# Patient Record
Sex: FEMALE | Race: ASIAN | NOT HISPANIC OR LATINO | Employment: UNEMPLOYED | URBAN - METROPOLITAN AREA
[De-identification: names, ages, dates, MRNs, and addresses within clinical notes are randomized per-mention and may not be internally consistent; named-entity substitution may affect disease eponyms.]

---
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
## 2019-05-17 ENCOUNTER — OFFICE VISIT (OUTPATIENT)
Dept: FAMILY MEDICINE | Facility: CLINIC | Age: 19
End: 2019-05-17
Payer: COMMERCIAL

## 2019-05-17 VITALS
HEIGHT: 60 IN | OXYGEN SATURATION: 99 % | DIASTOLIC BLOOD PRESSURE: 73 MMHG | TEMPERATURE: 99.1 F | WEIGHT: 110 LBS | SYSTOLIC BLOOD PRESSURE: 108 MMHG | HEART RATE: 67 BPM | RESPIRATION RATE: 12 BRPM | BODY MASS INDEX: 21.6 KG/M2

## 2019-05-17 DIAGNOSIS — R09.82 POSTNASAL DRIP: Primary | ICD-10-CM

## 2019-05-17 RX ORDER — CETIRIZINE HYDROCHLORIDE 5 MG/1
5 TABLET ORAL DAILY
Qty: 30 TABLET | Refills: 1 | Status: SHIPPED | OUTPATIENT
Start: 2019-05-17 | End: 2019-12-04

## 2019-05-17 RX ORDER — FLUTICASONE PROPIONATE 50 MCG
1 SPRAY, SUSPENSION (ML) NASAL DAILY
Qty: 16 G | Refills: 11 | Status: SHIPPED | OUTPATIENT
Start: 2019-05-17 | End: 2019-12-04

## 2019-05-17 ASSESSMENT — MIFFLIN-ST. JEOR: SCORE: 1199.43

## 2019-05-17 NOTE — NURSING NOTE
Due to patient being non-English speaking/uses sign language, an  was used for this visit. Only for face-to-face interpretation by an external agency, date and length of interpretation can be found on the scanned worksheet.     name: Jose Covington  Agency: [employer]  Language: Carole   Telephone number: [phone]  Type of interpretation: Face-to-face, spoken

## 2019-05-17 NOTE — PATIENT INSTRUCTIONS
1. Postnasal drip    - cetirizine (ZYRTEC) 5 MG tablet; Take 1 tablet (5 mg) by mouth daily  Dispense: 30 tablet; Refill: 1  - fluticasone (FLONASE) 50 MCG/ACT nasal spray; Spray 1 spray into both nostrils daily  Dispense: 16 g; Refill: 11  Back in 1 month if not better    Dr. Nargis Lentz

## 2019-05-17 NOTE — PROGRESS NOTES
SUBJECTIVE     Chief Complaint   Patient presents with     Cough     Ongoing dry and mucus cough     Nasal Congestion       Subjective: Lucia Covington is a 18 year old female with no significant past medical history here for cough.    Patient has had cough for the past month and 2 weeks of nasal congestion.  No fevers, otherwise feels well.  Eating okay, no nausea or vomiting.  Has had a history of trauma to her nose when she was younger.  She is unsure if she has any history of seasonal allergies.  She has not tried any medications for this.  She is unclear of any triggers making her symptoms worse.    ROS:    General: No fevers or weight loss  Skin: No new rashes or lesions.  CV: No chest pain or palpitations.  Resp: No shortness of breath or cough  GI: No nausea, vomiting, diarrhea, or constipation.    PMH/PSH, FamHx, Meds, Allergies reviewed and updated as needed.    Social History     Socioeconomic History     Marital status: Single     Spouse name: None     Number of children: None     Years of education: None     Highest education level: None   Occupational History     None   Social Needs     Financial resource strain: None     Food insecurity:     Worry: None     Inability: None     Transportation needs:     Medical: None     Non-medical: None   Tobacco Use     Smoking status: Never Smoker     Smokeless tobacco: Never Used     Tobacco comment: No Exposure    Substance and Sexual Activity     Alcohol use: No     Drug use: No     Sexual activity: Never   Lifestyle     Physical activity:     Days per week: None     Minutes per session: None     Stress: None   Relationships     Social connections:     Talks on phone: None     Gets together: None     Attends Hinduism service: None     Active member of club or organization: None     Attends meetings of clubs or organizations: None     Relationship status: None     Intimate partner violence:     Fear of current or ex partner: None     Emotionally abused: None  "    Physically abused: None     Forced sexual activity: None   Other Topics Concern     None   Social History Narrative     None           OBJECTIVE     /73 (BP Location: Left arm, Patient Position: Sitting, Cuff Size: Adult Regular)   Pulse 67   Temp 99.1  F (37.3  C) (Oral)   Resp 12   Ht 1.53 m (5' 0.25\")   Wt 49.9 kg (110 lb)   SpO2 99%   BMI 21.30 kg/m    Body mass index is 21.3 kg/m .    Physical exam:  Gen: No acute distress  HEENT: external auditory canals normal. TMs normal. No conjunctival injection. +clear rhinorrhea. Nasal septum deviated to patient's L with boggy bluish middle turbinates bilaterally. No pharyngeal erythema, +mucoid drainage of oropharynx  Neck: No cervical lymphadenopathy  CV: Regular rate and rhythm, no murmurs/rubs/gallops  Resp: Clear to auscultation bilaterally, no crackles or wheezes  ABD: soft, nontender, no masses, no rebound.  Psych: Mood and affect appropriate, mentation appears normal.    No results found for this or any previous visit (from the past 24 hour(s)).      ASSESSMENT AND PLAN     Lucia Covington is a 18 year old female here for cough    1. Postnasal drip  Normal vitals. Symptoms and exam suggest allergic rhinitis. Will try antihistamine and nasal corticosteroid, back if no relief.   - cetirizine (ZYRTEC) 5 MG tablet; Take 1 tablet (5 mg) by mouth daily  Dispense: 30 tablet; Refill: 1  - fluticasone (FLONASE) 50 MCG/ACT nasal spray; Spray 1 spray into both nostrils daily  Dispense: 16 g; Refill: 11      Nargis Lentz MD, PGY-3  I precepted today with Miles Richey MD.    "

## 2019-05-17 NOTE — PROGRESS NOTES
Preceptor Attestation:   Patient seen, evaluated and discussed with the resident. I have verified the content of the note, which accurately reflects my assessment of the patient and the plan of care.   Supervising Physician:  Miles Richey MD

## 2019-10-18 ENCOUNTER — OFFICE VISIT (OUTPATIENT)
Dept: FAMILY MEDICINE | Facility: CLINIC | Age: 19
End: 2019-10-18
Payer: COMMERCIAL

## 2019-10-18 VITALS
RESPIRATION RATE: 18 BRPM | TEMPERATURE: 97.9 F | DIASTOLIC BLOOD PRESSURE: 67 MMHG | SYSTOLIC BLOOD PRESSURE: 101 MMHG | HEIGHT: 60 IN | HEART RATE: 73 BPM | WEIGHT: 112.6 LBS | BODY MASS INDEX: 22.1 KG/M2 | OXYGEN SATURATION: 97 %

## 2019-10-18 DIAGNOSIS — Z00.00 ROUTINE GENERAL MEDICAL EXAMINATION AT A HEALTH CARE FACILITY: ICD-10-CM

## 2019-10-18 DIAGNOSIS — Z11.4 SCREENING FOR HIV (HUMAN IMMUNODEFICIENCY VIRUS): ICD-10-CM

## 2019-10-18 DIAGNOSIS — Z00.129 ENCOUNTER FOR ROUTINE CHILD HEALTH EXAMINATION WITHOUT ABNORMAL FINDINGS: Primary | ICD-10-CM

## 2019-10-18 DIAGNOSIS — Z23 NEED FOR PROPHYLACTIC VACCINATION AND INOCULATION AGAINST INFLUENZA: ICD-10-CM

## 2019-10-18 LAB — HIV 1+2 AB+HIV1 P24 AG SERPL QL IA: NEGATIVE

## 2019-10-18 ASSESSMENT — MIFFLIN-ST. JEOR: SCORE: 1209.12

## 2019-10-18 NOTE — PATIENT INSTRUCTIONS
-Stop by the lab for blood draw before leaving  -Come back in 1 year for a physical with pap smear    Preventive Health Recommendations  Female Ages 18 to 20     Yearly exam:     See your health care provider every year in order to  o Review health changes.   o Discuss preventive care.    o Review your medicines if your doctor has prescribed any.      You should be tested each year for STDs (sexually transmitted diseases).       After age 20, talk to your provider about how often you should have cholesterol testing.      If you are at risk for diabetes, you should have a diabetes test (fasting glucose).     Shots:     Get a flu shot each year.     Get a tetanus shot every 10 years.     Consider getting the shot (vaccine) that prevents cervical cancer (Gardasil).    Nutrition:     Eat at least 5 servings of fruits and vegetables each day.    Eat whole-grain bread, whole-wheat pasta and brown rice instead of white grains and rice.    Get adequate Calcium and Vitamin D.     Lifestyle    Exercise at least 150 minutes a week each week (30 minutes a day, 5 days a week). This will help you control your weight and prevent disease.    No smoking.     Wear sunscreen to prevent skin cancer.    See your dentist every six months for an exam and cleaning.

## 2019-10-18 NOTE — PROGRESS NOTES
Preceptor Attestation:   Patient seen, evaluated and discussed with the resident. I have verified the content of the note, which accurately reflects my assessment of the patient and the plan of care.   Supervising Physician:  Charli Hou MD

## 2019-10-18 NOTE — NURSING NOTE
Injectable influenza vaccine documentation    1. Has the patient received the information for the influenza vaccine? YES    2. Does the patient have a severe allergy to eggs (Patients with a severe egg allergy should be assessed by a medical provider, RN, or clinical pharmacist. If they receive the influenza vaccine, please have them observed for 15 minutes.)? No    3. Has the patient had an allergic reaction to previous influenza vaccines? No    4. Has the patient had any severe allergic reactions to past influenza vaccines ? No       5. Does patient have a history of Guillain-Brooks syndrome? No      Based on responses above, I administered the influenza vaccine.  Lizz Walters CMA    Due to patient being non-English speaking/uses sign language, an  was used for this visit. Only for face-to-face interpretation by an external agency, date and length of interpretation can be found on the scanned worksheet.     name: Jose Covington  Agency: [employer]  Language: Carole   Telephone number: [phone]  Type of interpretation: Face-to-face, spoken

## 2019-10-18 NOTE — PROGRESS NOTES
Female Physical Note    Concerns today: No special concerns today.    A Unbounce  was used for  this visit.     ROS:  CONSTITUTIONAL: no fatigue, no unexpected change in weight  SKIN: no worrisome rashes, no worrisome moles, no worrisome lesions  EYES: no acute vision problems or changes  ENT: no ear problems, no mouth problems, no throat problems  RESP: no significant cough, no shortness of breath  CV: no chest pain, no palpitations, no new or worsening peripheral edema  GI: no nausea, no vomiting, no constipation, no diarrhea    Sexually Active: No  Sexual concerns: No   Contraception: Not needed   P:0  Menarche: Irregular Patient's last menstrual period was 2019 (approximate). Lastin days,  Normal  STD History: Neg  Last Pap Smear Date: n/a     No active medical problems.    Current Outpatient Medications   Medication Sig Dispense Refill     cetirizine (ZYRTEC) 5 MG tablet Take 1 tablet (5 mg) by mouth daily (Patient not taking: Reported on 10/18/2019) 30 tablet 1     cholecalciferol (VITAMIN D3) 1000 UNIT tablet Take 2 tablets (2,000 Units) by mouth daily (Patient not taking: Reported on 10/19/2018) 100 tablet 3     ferrous sulfate (IRON) 325 (65 FE) MG tablet Take 1 tablet (325 mg) by mouth daily (with breakfast) (Patient not taking: Reported on 10/19/2018) 90 tablet 2     fluticasone (FLONASE) 50 MCG/ACT nasal spray Spray 1 spray into both nostrils daily (Patient not taking: Reported on 10/18/2019) 16 g 11     History reviewed. No pertinent past medical history.     Family History     Problem (# of Occurrences) Relation (Name,Age of Onset)    Cancer (1) Mother: Esophageal,  in  at age 39    Diabetes (1) Father    Hyperlipidemia (1) Father    Hypertension (1) Father       Negative family history of: C.A.D., Cardiovascular, Coronary Artery Disease        Problem List Medication List and Allergy List were reviewed and discussed briefly.    Patient is an established patient of this  "clinic..    Social History     Tobacco Use     Smoking status: Never Smoker     Smokeless tobacco: Never Used     Tobacco comment: No Exposure    Substance Use Topics     Alcohol use: No     Single  Children ? no    Has anyone hurt you physically, for example by pushing, hitting, slapping or kicking you or forcing you to have sex? Denies  Do you feel threatened or controlled by a partner, ex-partner or anyone in your life? Denies    RISK BEHAVIORS AND HEALTHY HABITS:  Tobacco Use/Smoking: None  Illicit Drug Use: None  Do you use alcohol? No  Diet (5-7 servings of fruits/veg daily): currents at 2-3 servings per day   Exercise (30 min accumulated most days):No  Dental Care: Yes   Calcium 1500 mg/d: does not eat dairy, does eat green leafy vegetables  Seat Belt Use: Yes     HIV screening:  Recommended and patient accepted testing.    Immunization History   Administered Date(s) Administered     HEPA 10/14/2015, 11/04/2016     HPV 11/23/2012, 06/12/2013, 06/09/2014     HepB 05/22/2012, 10/01/2012, 06/12/2013     Influenza (IIV3) PF 10/23/2012, 10/17/2013     Influenza Vaccine, 3 YRS +, IM (QUADRIVALENT W/PRESERVATIVES) 11/26/2014, 10/14/2015, 11/04/2016, 11/08/2017, 10/19/2018     MMR 05/22/2012, 10/23/2012     Meningococcal (Menactra ) 10/14/2015     Meningococcal (Menomune ) 10/23/2012     Poliovirus, inactivated (IPV) 10/01/2012, 11/23/2012, 06/12/2013     Tdap (Adacel,Boostrix) 05/22/2012, 10/01/2012, 06/12/2013     Varicella 10/01/2012, 10/23/2012, 10/14/2015    Reviewed Immunization Record Today    EXAMINATION:   /67   Pulse 73   Temp 97.9  F (36.6  C) (Oral)   Resp 18   Ht 1.535 m (5' 0.43\")   Wt 51.1 kg (112 lb 9.6 oz)   LMP 09/16/2019 (Approximate)   SpO2 97%   BMI 21.68 kg/m    GENERAL: healthy, alert and no distress  EYES: Eyes grossly normal to inspection, extraocular movements - intact, and PERRL  HENT: ear canals- normal; TMs- normal; Nose- normal; Mouth- no ulcers, no lesions  NECK: no " tenderness, no adenopathy, no asymmetry, no masses, no stiffness; thyroid- normal to palpation  RESP: lungs clear to auscultation - no rales, no rhonchi, no wheezes  BREAST: no masses, no tenderness, no nipple discharge, no palpable axillary masses or adenopathy  CV: regular rates and rhythm, normal S1 S2, no S3 or S4 and no murmur, no click or rub -  ABDOMEN: soft, no tenderness, no  hepatosplenomegaly, no masses, normal bowel sounds  MS: extremities- no gross deformities noted, no edema  SKIN: no suspicious lesions, no rashes  NEURO: strength and tone- normal, sensory exam- grossly normal, mentation- intact, speech- normal, reflexes- symmetric  BACK: no CVA tenderness, no paralumbar tenderness  - female: cervix- normal, adnexae- normal; uterus- normal, no masses, no discharge  RECTAL- female: no masses, no hemorrhoids  PSYCH: Alert and oriented times 3; speech- coherent , normal rate and volume; able to articulate logical thoughts, able to abstract reason, no tangential thoughts, no hallucinations or delusions, affect- normal  LYMPHATICS: ant. cervical- normal, post. cervical- normal, axillary- normal, supraclavicular- normal, inguinal- normal    ASSESSMENT:  1. Health Care Maintenance: Normal Physical Exam    PLAN:  1. Routine follow up in one year.    Fely Pittman MD PGY2

## 2019-12-04 ENCOUNTER — OFFICE VISIT (OUTPATIENT)
Dept: FAMILY MEDICINE | Facility: CLINIC | Age: 19
End: 2019-12-04
Payer: COMMERCIAL

## 2019-12-04 VITALS
BODY MASS INDEX: 21.6 KG/M2 | DIASTOLIC BLOOD PRESSURE: 86 MMHG | OXYGEN SATURATION: 97 % | HEIGHT: 60 IN | TEMPERATURE: 98 F | RESPIRATION RATE: 16 BRPM | WEIGHT: 110 LBS | SYSTOLIC BLOOD PRESSURE: 123 MMHG | HEART RATE: 84 BPM

## 2019-12-04 DIAGNOSIS — K13.70 LESION OF MOUTH: Primary | ICD-10-CM

## 2019-12-04 DIAGNOSIS — R05.9 COUGH: ICD-10-CM

## 2019-12-04 ASSESSMENT — MIFFLIN-ST. JEOR: SCORE: 1196.71

## 2019-12-04 NOTE — PATIENT INSTRUCTIONS
1. Try Orajel for mouth pain (over the counter)  2. Avoid spicy and acidic foods until lesion is healed  3. If cough and sore throat don't resolve in 10 days  4. Take tylenol or ibuprofen for headache  5. Continue to stay hydrated as much as possible

## 2019-12-04 NOTE — NURSING NOTE
Due to patient being non-English speaking/uses sign language, an  was used for this visit. Only for face-to-face interpretation by an external agency, date and length of interpretation can be found on the scanned worksheet.     name: Jose Covington  Agency: [employer]  Language: Carole  Telephone number: [phone]  Type of interpretation: Face-to-face, spoken

## 2019-12-04 NOTE — PROGRESS NOTES
"       SUBJECTIVE       Lucia Cady Covington is a 19 year old  female with an unremarkable PMH  She presents with mouth sore. Notes that she bit her lip about 4 days ago when chewing food. Since then it has been painful and has made it difficult to eat or drink. Today she was able to tolerate some fluids and soups today. Has been making urine normally, is slightly darker in color than normal. Has tried some traditional herbs for the sore spot in her mouth. Spicy foods seemed to aggregate the pain. Has been brushing teeth but avoiding the area. Follows with dentist regularly. No history of oral herpes or other lesions. No nocturnal teeth grinding.     Also notes new cough, sore throat, and headache. Headache is in front of head and mild. Has not tried anything to relieve the headache. No changes in vision, dizziness, weakness.  Similar symptoms to her niece.  No rhinorrhea, dizziness, dysuria. No fever, chills, diarrhea, shortness of breath, or chest pain.     Is not sexually active and not on any form of birth control or other medication.     PMH, Medications and Allergies were reviewed and updated as needed.        REVIEW OF SYSTEMS     As above        OBJECTIVE     Vitals:    12/04/19 1519   BP: 123/86   BP Location: Left arm   Pulse: 84   Resp: 16   Temp: 98  F (36.7  C)   TempSrc: Oral   SpO2: 97%   Weight: 49.9 kg (110 lb)   Height: 1.534 m (5' 0.39\")     Body mass index is 21.2 kg/m .    Constitutional: Awake, alert, cooperative, no apparent distress, and appears stated age.  Eyes: Lids and lashes normal, pupils equal, sclera clear, conjunctiva normal.  ENT: oral pharynx with moist mucus membranes, tonsils without erythema or exudates, gums normal and good dentition. Small 2mm x 2mm dark depression of a healing lesion on lower internal lip on left side.   Neck: Supple, symmetrical, trachea midline, no adenopathy  Hematologic / Lymphatic: No cervical lymphadenopathy and no supraclavicular lymphadenopathy.  Lungs: No " increased work of breathing, good air exchange, clear to auscultation bilaterally, no crackles or wheezing; dry cough  Cardiovascular: Regular rate and rhythm, normal S1 and S2, no S3 or S4, and no murmur noted.  Neurologic: Awake, alert, oriented. Gait is normal. No obvious neuro deficits, nor facial droop. Moving all limbs purposefully.   Neuropsychiatric: Normal affect, mood, orientation, memory and insight.  Skin: No rashes, erythema, pallor, petechia or purpura.    No results found for this or any previous visit (from the past 24 hour(s)).        ASSESSMENT AND PLAN     1. Lesion of mouth  Due to trauma from biting it. Healing well. Recommended avoiding spicy and acidic foods. Orajel OTC for pain. Taking adequate oral intake and urinating normally. No concern for dehydration.    2. Cough/Headache  Likely d/t viral URI. Up to date on flu shot this year. Encouraged oral fluids and OTC ibuprofen or tylenol for headache.     RTC for normal physical exam next year or sooner (10 days) if develops new or worsening symptoms.    Linda Murray MD PGY-1  [facility]    I precepted today with Kelly Finn MD.

## 2019-12-04 NOTE — PROGRESS NOTES
"Preceptor attestation:  Vital signs reviewed: /86 (BP Location: Left arm)   Pulse 84   Temp 98  F (36.7  C) (Oral)   Resp 16   Ht 1.534 m (5' 0.39\")   Wt 49.9 kg (110 lb)   LMP 11/27/2019 (Exact Date)   SpO2 97%   BMI 21.20 kg/m      Patient seen, evaluated, and discussed with the resident.  I have verified the content of the note, which accurately reflects my assessment of the patient and the plan of care.    Supervising physician: Kelly Finn MD  [facility]  "

## 2019-12-13 ENCOUNTER — OFFICE VISIT (OUTPATIENT)
Dept: FAMILY MEDICINE | Facility: CLINIC | Age: 19
End: 2019-12-13
Payer: COMMERCIAL

## 2019-12-13 VITALS
BODY MASS INDEX: 20.96 KG/M2 | RESPIRATION RATE: 22 BRPM | DIASTOLIC BLOOD PRESSURE: 74 MMHG | HEART RATE: 76 BPM | OXYGEN SATURATION: 98 % | TEMPERATURE: 98.3 F | WEIGHT: 111 LBS | SYSTOLIC BLOOD PRESSURE: 122 MMHG | HEIGHT: 61 IN

## 2019-12-13 DIAGNOSIS — J15.7 PNEUMONIA DUE TO MYCOPLASMA PNEUMONIAE, UNSPECIFIED LATERALITY, UNSPECIFIED PART OF LUNG: Primary | ICD-10-CM

## 2019-12-13 RX ORDER — AZITHROMYCIN 250 MG/1
TABLET, FILM COATED ORAL
Qty: 6 TABLET | Refills: 0 | Status: SHIPPED | OUTPATIENT
Start: 2019-12-13 | End: 2019-12-18

## 2019-12-13 ASSESSMENT — MIFFLIN-ST. JEOR: SCORE: 1202.93

## 2019-12-13 NOTE — PROGRESS NOTES
"       SUBJECTIVE       Lucia Cady Covington is a 19 year old  female with a PMH significant for:   There is no problem list on file for this patient.    Forceful cough. Seen earlier this month in Clinic, but getting worse. Up some at night. Talking triggers. Others (niece, brothers) with similar      PMH, Medications and Allergies were reviewed and updated as needed.        REVIEW OF SYSTEMS     General: Tactile fever at night w/ chills, sweats. No unexplained weight loss  Head: No headache  Neck: No swallowing problems   CV: No chest pain or palpitations  Resp:see HPI. No hemoptysis.  GI: No constipation, diarrhea, or blood in stool.  no nausea or vomiting  : No pain passing urine or urinary frequency            OBJECTIVE     Vitals:    12/13/19 1406   BP: 122/74   BP Location: Right arm   Patient Position: Sitting   Cuff Size: Adult Regular   Pulse: 76   Resp: 22   Temp: 98.3  F (36.8  C)   TempSrc: Oral   SpO2: 98%   Weight: 50.3 kg (111 lb)   Height: 1.537 m (5' 0.5\")     Body mass index is 21.32 kg/m .    Gen:  Well nourished and in NAD  HEENT: PERRLA; TMs normal color and landmarks; nasopharynx pink and moist; oropharynx pink and moist  Neck: supple without lymphadenopathy  CV:  RRR  - no murmurs, rubs, or gallups,   Pulm:  FAir air entry, scattered ronchi  ABD: soft, nontender, no masses, no rebound, BS intact throughout  Extrem: no cyanosis, edema or clubbing  Psych: Euthymic     No results found for this or any previous visit (from the past 24 hour(s)).        ASSESSMENT AND PLAN     Lucia was seen today for cough.    Diagnoses and all orders for this visit:    Pneumonia due to Mycoplasma pneumoniae, unspecified laterality, unspecified part of lung  -     azithromycin (ZITHROMAX) 250 MG tablet; Take 2 tablets (500 mg) by mouth daily for 1 day, THEN 1 tablet (250 mg) daily for 4 days.        Patient Instructions   Forceful cough. Seen earlier this month in Clinic, but getting worse. Up some at night. Talking " triggers. Others (niece, brothers) with similar    1) Zithro.  Antibiotic. Might help you get better sooner, limits spread to others.  Cough may last another 2-3 weeks    2) Fluids    3) Recheck in one week if no better OR three weeks if not clearing      Total of 30 minutes was spent in face to face contact with patient with > 50% in counseling and coordination of care.  Options for treatment and/or follow-up care were reviewed with the patient. Lucia Covington was engaged and actively involved in the decision making process. She verbalized understanding of the options discussed and was satisfied with the final plan.    Miles Richey MD

## 2019-12-13 NOTE — PATIENT INSTRUCTIONS
Forceful cough. Seen earlier this month in Clinic, but getting worse. Up some at night. Talking triggers. Others (niece, brothers) with similar    1) Zithro.  Antibiotic. Might help you get better sooner, limits spread to others.  Cough may last another 2-3 weeks    2) Fluids    3) Recheck in one week if no better OR three weeks if not clearing

## 2019-12-13 NOTE — NURSING NOTE
Due to patient being non-English speaking/uses sign language, an  was used for this visit. Only for face-to-face interpretation by an external agency, date and length of interpretation can be found on the scanned worksheet.     name: Jose Covington  Agency: [employer]  Language: Carole   Telephone number: [phone]  Type of interpretation: Face-to-face, spoken

## 2020-07-21 ENCOUNTER — VIRTUAL VISIT (OUTPATIENT)
Dept: FAMILY MEDICINE | Facility: CLINIC | Age: 20
End: 2020-07-21

## 2020-07-21 DIAGNOSIS — Z32.01 POSITIVE PREGNANCY TEST: Primary | ICD-10-CM

## 2020-07-21 RX ORDER — PYRIDOXINE HCL (VITAMIN B6) 25 MG
25 TABLET ORAL 3 TIMES DAILY PRN
Qty: 90 TABLET | Refills: 1 | Status: SHIPPED | OUTPATIENT
Start: 2020-07-21 | End: 2021-03-01

## 2020-07-21 RX ORDER — PRENATAL VIT/IRON FUM/FOLIC AC 27MG-0.8MG
1 TABLET ORAL DAILY
Qty: 90 TABLET | Refills: 3 | Status: SHIPPED | OUTPATIENT
Start: 2020-07-21 | End: 2020-12-07

## 2020-07-21 NOTE — PROGRESS NOTES
Preceptor Attestation:   I talked to the patient on the phone. I discussed the patient with the resident. I have verified the content of the note, which accurately reflects my assessment of the patient and the plan of care.   Supervising Physician:  Damien Shea MD.

## 2020-07-21 NOTE — PROGRESS NOTES
"Family Medicine Telephone Visit Note         Telephone Visit Consent   Patient was verbally read the following and verbal consent was obtained.    \"Telephone visits are billed at different rates depending on your insurance coverage. During this emergency period, for some insurers they may be billed the same as an in-person visit.  Please reach out to your insurance provider with any questions.  If during the course of the call the physician/provider feels a telephone visit is not appropriate, you will not be charged for this service.\"    Name person giving consent:  Patient   Date verbal consent given:  2020  Time verbal consent given:  9:49 AM    Chief Complaint   Patient presents with     other     positive home pregnancy test x3      Due to patient being non-English speaking/uses sign language, an  was used for this visit. Only for face-to-face interpretation by an external agency, date and length of interpretation can be found on the scanned worksheet.     name: Jose Covington  Agency: [employer]  Language: Fluxome   Telephone number: [phone]  Type of interpretation: Telephone, spoken         HPI   Patients name: Lucia  Appointment start time:  10:17 AM    Pregnancy Test/Confirmation      Lucia is a 19 year old woman . without a significant past medical history who presents requesting a pregnancy test. She has had 3 positive pregnancy tests at home with the most recent one being  and first test on . LMP was at the end of May. Pregnancy unplanned, but patient happy. No prior pregnancies.   Her Patient's last menstrual period was 2020 (approximate). sure.Previous normal periods: Occasionally misses periods, somewhat irregular   Contraceptive method : none  Symptoms of pregnancy: fatigue, nausea/morning sickness and breast tenderness. No associated vomiting with nausea.   Any Bleeding since LMP? Yes Details: some minimal spotting in .     If pregnant, pregnancy is " "Unplanned, Desired        No current outpatient medications on file.     No Known Allergies           Review of Systems:     Constitutional, HEENT, cardiovascular, pulmonary, gi and gu systems are negative, except as otherwise noted.         Physical Exam:     There were no vitals taken for this visit.  Estimated body mass index is 21.32 kg/m  as calculated from the following:    Height as of 12/13/19: 1.537 m (5' 0.5\").    Weight as of 12/13/19: 50.3 kg (111 lb).    Exam:  Constitutional: healthy, alert and no distress  Psychiatric: mentation appears normal and affect normal/bright     Results from the last 24 hoursNo results found for this or any previous visit (from the past 24 hour(s)).        Assessment and Plan     1. Positive pregnancy test  Positive pregnancy test x3 at home. Given est LMP, approximately 7-8 weeks pregnant. She has had some spotting, but no pain. Ordered ultrasound, prenatal vitamin and also vitamin B6 for nausea today. Discussed medications to avoid during pregnancy and to call clinic if new symptoms or recurrent bleeding occurs.   - Prenatal Vit-Fe Fumarate-FA (PRENATAL MULTIVITAMIN W/IRON) 27-0.8 MG tablet; Take 1 tablet by mouth daily  Dispense: 90 tablet; Refill: 3  - US OB <14 WKS SINGLE OR FIRST GESTATION; Future  - pyridOXINE (VITAMIN B6) 25 MG tablet; Take 1 tablet (25 mg) by mouth 3 times daily as needed (Nausea)  Dispense: 90 tablet; Refill: 1      Refilled medications that would be required in the next 3 months.     After Visit Information:  Patient declined AVS     No follow-ups on file.    Appointment end time: 10:34 AM  This is a telephone visit that took 24 minutes.      Clinician location:  [facility]     Rakel Antunez MD  I precepted today with Dr. Damien Shea.        "

## 2020-07-23 ENCOUNTER — TELEPHONE (OUTPATIENT)
Dept: FAMILY MEDICINE | Facility: CLINIC | Age: 20
End: 2020-07-23

## 2020-07-23 NOTE — TELEPHONE ENCOUNTER
----- Message from Haley Macias RN sent at 7/23/2020 10:48 AM CDT -----  Regarding: NOB  Please schedule pt for an OB dating ultrasound and NOB appt with Dr Antunez.    Thanks,    Haley

## 2020-08-10 ENCOUNTER — ALLIED HEALTH/NURSE VISIT (OUTPATIENT)
Dept: FAMILY MEDICINE | Facility: CLINIC | Age: 20
End: 2020-08-10

## 2020-08-10 ENCOUNTER — OFFICE VISIT (OUTPATIENT)
Dept: FAMILY MEDICINE | Facility: CLINIC | Age: 20
End: 2020-08-10

## 2020-08-10 VITALS
HEIGHT: 61 IN | TEMPERATURE: 98.7 F | WEIGHT: 107.2 LBS | DIASTOLIC BLOOD PRESSURE: 71 MMHG | SYSTOLIC BLOOD PRESSURE: 105 MMHG | HEART RATE: 89 BPM | RESPIRATION RATE: 16 BRPM | BODY MASS INDEX: 20.24 KG/M2

## 2020-08-10 DIAGNOSIS — O09.90 SUPERVISION OF HIGH RISK PREGNANCY, ANTEPARTUM: ICD-10-CM

## 2020-08-10 DIAGNOSIS — Z60.3 LANGUAGE BARRIER, CULTURAL DIFFERENCES: ICD-10-CM

## 2020-08-10 DIAGNOSIS — Z32.01 POSITIVE PREGNANCY TEST: ICD-10-CM

## 2020-08-10 DIAGNOSIS — O09.91 HIGH-RISK PREGNANCY IN FIRST TRIMESTER: Primary | ICD-10-CM

## 2020-08-10 LAB
BACTERIA: NORMAL
BILIRUBIN UR: NEGATIVE MG/DL
BLOOD UR: NEGATIVE MG/DL
CLUE CELLS: NORMAL
GLUCOSE URINE: NEGATIVE
HCT VFR BLD AUTO: 41.2 % (ref 35–47)
HEMOGLOBIN: 13 G/DL (ref 11.7–15.7)
HIV 1+2 AB+HIV1 P24 AG SERPL QL IA: NEGATIVE
KETONES UR QL: ABNORMAL MG/DL
LEUKOCYTE ESTERASE UR: NEGATIVE
MCH RBC QN AUTO: 29.5 PG (ref 26.5–35)
MCHC RBC AUTO-ENTMCNC: 31.6 G/DL (ref 32–36)
MCV RBC AUTO: 93.7 FL (ref 78–100)
MOTILE TRICHOMONAS: NEGATIVE
NITRITE UR QL STRIP: NEGATIVE MG/DL
ODOR: NORMAL
PH UR STRIP: 8.5 [PH] (ref 4.5–8)
PH WET PREP: NORMAL (ref 3.8–4.5)
PLATELET # BLD AUTO: 261 K/UL (ref 150–450)
PROTEIN UR: NEGATIVE MG/DL
RBC # BLD AUTO: 4.4 M/UL (ref 3.8–5.2)
SP GR UR STRIP: 1.02 (ref 1–1.03)
UROBILINOGEN UR STRIP-ACNC: ABNORMAL E.U./DL
WBC # BLD AUTO: 7.8 K/UL (ref 4–11)
WBC WET PREP: NORMAL (ref 2–5)
YEAST: NORMAL

## 2020-08-10 SDOH — ECONOMIC STABILITY: FOOD INSECURITY: WITHIN THE PAST 12 MONTHS, YOU WORRIED THAT YOUR FOOD WOULD RUN OUT BEFORE YOU GOT MONEY TO BUY MORE.: NEVER TRUE

## 2020-08-10 SDOH — ECONOMIC STABILITY: INCOME INSECURITY: HOW HARD IS IT FOR YOU TO PAY FOR THE VERY BASICS LIKE FOOD, HOUSING, MEDICAL CARE, AND HEATING?: NOT HARD AT ALL

## 2020-08-10 SDOH — SOCIAL STABILITY - SOCIAL INSECURITY: ACCULTURATION DIFFICULTY: Z60.3

## 2020-08-10 SDOH — ECONOMIC STABILITY: FOOD INSECURITY: WITHIN THE PAST 12 MONTHS, THE FOOD YOU BOUGHT JUST DIDN'T LAST AND YOU DIDN'T HAVE MONEY TO GET MORE.: NEVER TRUE

## 2020-08-10 SDOH — ECONOMIC STABILITY: TRANSPORTATION INSECURITY
IN THE PAST 12 MONTHS, HAS LACK OF TRANSPORTATION KEPT YOU FROM MEETINGS, WORK, OR FROM GETTING THINGS NEEDED FOR DAILY LIVING?: NO

## 2020-08-10 SDOH — ECONOMIC STABILITY: TRANSPORTATION INSECURITY
IN THE PAST 12 MONTHS, HAS THE LACK OF TRANSPORTATION KEPT YOU FROM MEDICAL APPOINTMENTS OR FROM GETTING MEDICATIONS?: NO

## 2020-08-10 ASSESSMENT — MIFFLIN-ST. JEOR: SCORE: 1193.64

## 2020-08-10 NOTE — PROGRESS NOTES
"Preceptor attestation:  Vital signs reviewed: /71   Pulse 89   Temp 98.7  F (37.1  C)   Resp 16   Ht 1.549 m (5' 1\")   Wt 48.6 kg (107 lb 3.2 oz)   LMP 05/29/2020 (Approximate)   BMI 20.26 kg/m      Patient seen, evaluated, and discussed with the resident.  I have verified the content of the note, which accurately reflects my assessment of the patient and the plan of care.    Supervising physician: Kelly Finn MD  [facility]    "

## 2020-08-10 NOTE — PROGRESS NOTES
Due to patient being non-English speaking/uses sign language, an  was used for this visit. Only for face-to-face interpretation by an external agency, date and length of interpretation can be found on the scanned worksheet.     name: Jose Covington  Agency: [employer]  Language: Nupur   Telephone number: [phone]  Type of interpretation: Telephone, spoken

## 2020-08-10 NOTE — PROGRESS NOTES
First Obstetric Visit       HPI       Lucia Covington is a 19 year old woman who presents for an initial prenatal visit at 8w2d weeks of pregnancy with SG of Mar 20, 2021 by LMP of Patient's last menstrual period was 2020 (approximate)..      She has had bleeding since her LMP. Had an episode early one once, and only appeared to be bright red spotting. Resolved.    She has had mild nausea, vomiting once in a while. Does endorse a low appetite and has lost 3lbs from her prepregnancy weight.    This was not a planned pregnancy. She is very happy about it.     OTHER CONCERNS: Did endorse constipation at the very beginning of her pregnancy, however, has resolved.               Labor Risk Assessment     Is the patient's age <18 or >40?     No  Patint's BMI is Body mass index is 20.26 kg/m .   Does patient have a BMI < 18.5?     No  Prior delivery within 6 months?      No  Ever delivered prior to 37 weeks gestation?  No  Pregnancy occur via In Vitro Fertilization?   No  Are you carrying twins?       No    The patient has the following additional risk factors for  labor:     none     Labor Risk Summary:  Pt is high risk for  Labor  No               Past Medical History     Past Medical History:   Diagnosis Date     Known health problems: none      See nurse history note, reviewed in detail.             Current Medications      Current Outpatient Medications   Medication Sig Dispense Refill     Prenatal Vit-Fe Fumarate-FA (PRENATAL MULTIVITAMIN W/IRON) 27-0.8 MG tablet Take 1 tablet by mouth daily 90 tablet 3     pyridOXINE (VITAMIN B6) 25 MG tablet Take 1 tablet (25 mg) by mouth 3 times daily as needed (Nausea) 90 tablet 1             Review of Systems      ROS:  YES - Headache  No - Changes in vision  No - Chest Pain  No - Shortness of Breath  YES - Nausea   YES - Vomiting  No - Abdominal pain   No - Contractions  No - Dysuria   No - Vaginal Discharge    No - Vaginal bleeding   No -  "Loss of Fluid   No - Extremity swelling     ====================================================           Physical Exam      /71   Pulse 89   Temp 98.7  F (37.1  C)   Resp 16   Ht 1.549 m (5' 1\")   Wt 48.6 kg (107 lb 3.2 oz)   LMP 05/29/2020 (Approximate)   BMI 20.26 kg/m    GENERAL: healthy, alert and no distress  NECK: no tenderness, no adenopathy, no asymmetry, no masses, no stiffness; thyroid- normal to palpation  RESP: lungs clear to auscultation - no rales, no rhonchi, no wheezes  CV: regular rates and rhythm, normal S1 S2, no S3 or S4 and no murmur, no click or rub -  ABDOMEN: soft, no tenderness, no  hepatosplenomegaly, no masses, normal bowel sounds  MS: extremities- no gross deformities noted, no edema  No scars noted.. FHT not examined  GENITALIA:  BUS WNL, no lesions noted   VAGINA:  Pink, normal rugae and discharge normal and physiologic  CERVIX:  smooth, without discharge or CMT and parous os,   firm/ closed and long.  UTERUS: Anteverted, nontender 8 weeks in size.  ADNEXA: Without masses or tenderness    Past labs reviewed:  Yes  Varicella immune Unknown  Hepatitis B immune Unknown  Last pap N/A. Patient is under 21 years of age. Will be due when she is 20 years old.    =========================================         Assessment and Plan     Lucia was seen today for prenatal care.    Diagnoses and all orders for this visit:    High-risk pregnancy in first trimester  -     ABO/Rh Type-HML ([facility])  -     Antibody Screen ([facility])  -     Chlamydia/Gono Amplified ([facility]  -     CBC with Plt ([facility])  -     Culture Urine ([facility]  -     Hepatitis B Surface Ag ([facility])  -     HIV Ag/Ab Screen [place] ([facility])  -     Lead, Blood ([facility]  -     Rubella  IgG ([facility]  -     Syphilis Screen [place] ([facility])  -     Urinalysis([facility])  -     Hepatitis B Surface Ab ([facility])  -     Annalise Zoster Imm Status Kimber ([facility])  -     Wet Prep ([facility])        19 year " old  , 8w2d weeks of pregnancy with SG of Mar 20, 2021 by LMP of Patient's last menstrual period was 2020 (approximate)..  By 1st trimester ultrasound. Differs from LMP by greater than 7 days.     Pregnancy Risk Assessment: High Risk pregnancy  Discussed high risk conditions as follows: Language Barrier    -Dating US obtained and dating confirmed?   Yes (as stated above)  -Taking PNV/Folate?     Yes      - Ordered new OB labs: blood type and antibody screen, HIV, VDRL, hep B, rubella, UA/UC, gonorrhea/chlamydia, Hepatitis B immunity, Varicella immunity and Wet prep done today.    -Discussed risks and benefits of second trimester quad screen fro trisomies, patient declined or was at too advanced a gestational age for testing.   - Discussed genetic screening. Patient does not want to pursue screening.   - Discussed screening for sickle cell anemia. Patient does not  want to pursue Hb electrophoresis.     - Discussed early screening for gestational diabetes. The patient does not have a history of GDM, BMI>30, h/o prediabetes/glucose intolerance, first degree relative with GDM or DM, or chronic HTN, so WILL NOT obtain early GCT or A1c.    - The patient does not h/o severe, early preeclampsia with delivery <34weeks, preeclampsia in more than one pregnancy, pre-gestational diabetes, chronic hypertension, renal disease, or autoimmune disease so WILL NOT start low dose aspirin (81mg) and calcium supplementation (1g-1.5g/day elemental = 2500mg- 3750mg/day Calcium carbonate) to prevent preeclampsia.    - The patient  does not have a history of spontaneous  birth so  WILL NOT consider progesterone starting at 16-20 weeks and/or serial transvaginal cervical length ultrasounds from 16-24 weeks.     - Prenatal vitamins ordered.    Counseling given:   - Follow up in 4 weeks for return OB visit.  - Recommended weight gain for pregnancy: 25-35 lbs (pregravid BMI 18.5-24.9)      - Instructed on best evidence for:  healthy diet and foods to avoid; exercise and activity during pregnancy; avoiding exposure to toxoplasmosis; safe use of seatbelts during pregnancy; and maintenance of a generally healthy lifestyle  -Patient to see OB educator/ RN today and/or next visit.    Discussed the harms, benefits, side effects and alternative therapies for current prescribed and OTC medications.      There are no Patient Instructions on file for this visit.    Options for treatment and follow-up care were reviewed with the patient and/or guardian. Lucia Covington and/or guardian engaged in the decision making process and verbalized understanding of the options discussed and agreed with the final plan.    Patient and plan discussed with [facility] attending, Dr. Kelly Finn.     Linda Tafoya MD PGY3  [facility]

## 2020-08-10 NOTE — PROGRESS NOTES
Past Medical History     Do you have a history of any of the following medical conditions?    Condition No/Yes/Details   Hypertension No    Heart disease, mitral valve prolapse, rheumatic fever No    Asthma or another chronic lung disease No    An autoimmune disorder No    Kidney disease No    Frequent urinary tract infections No    Epilepsy, seizures, or spells No    Migraine headaches No    Stroke, loss of sensation/function, seizures, or other neuro problem No    Diabetes No    Thyroid problems or have you taken thyroid medication No    Hepatitis, liver disease, jaundice No    Blood clots, phlebitis, pulmonary embolism or varicose veins No    Excessive bleeding after surgery or dental work No    Do you have more bleeding than other women after a cut or a scratch? No    Anemia No    Blood transfusions No         Would you refuse a blood transfusion?       No   If yes, then ask next question. If no, skip next question.   Would you rather die than receive a blood transfusion? No    Breast problems No    Have you ever ? No plans to breast feed   Abnormalities of the uterus No    Abnormal pap smear No    Have you ever been treated for depression? No    Are you having problems with crying spells or loss of self-esteem? No    Have you ever required psychiatric care? No    Have you been physically, sexually, or emotionally hurt by someone? No    Have you been in a major accident or suffered serious trauma? No    Have you ever had any gynecological surgical procedures such as cervical conization, LEEP, laser treatment, cryosurgery of the cervix, or a dilatation and curettage? No    Have you had any other surgical procedures? No         Have you ever had any complications from anesthesia? No    Have you ever been hospitalized for a nonsurgical reason? No             Substance use and exposure     Does anyone in your home smoke? No    Do you use tobacco products or betel nut? No    Do you drink beer, wine,  or hard liquor? No    Do you use any of the following: marijuana, speed, cocaine, heroin, hallucinogens, or other drugs? No               Symptoms since Last Menstrual Period     Do you currently have any of the following symptoms: No/Yes/Details        Abdominal pain No         Blood in the stools or urine No         Chest pain No         Shortness of breath No         coughing or vomiting up blood No         Your heart racing or skipping beats No         Nausea or vomiting  YES mainly nausea but vomit sometimes, decreased appetite        Pain on urination No         Vaginal discharge or bleeding No                Genetic Screening          Is the patient 35 years or older? No      Do you have a history of any of the following No/Yes/Details        A metabolic disorder (e.g. Insulin-dependent DM, PKU) No         Recurrent pregnancy loss or still birth No      Do you, the baby's father, or anyone in your families have          Thalassemia AND MCV <80 No         Hemophilia No         Neural tube defect No }        Congenital heart defect No         Sickle cell disease or trait No         Muscular dystrophy No         Cystic fibrosis No         Mental retardation or autism No         Down's syndrome No         Paul-Sach's disease No         [place]'s chorea No         Any other inherited genetic or chromosomal disorder No         A child with birth defects not listed above No                Infection History     Ever treated for tuberculosis or had a positive skin test No    Ever had genital herpes (or has your partner) No    Had a rash or viral illness since LMP No    Ever had a sexually transmitted infection No    Ever had chicken pox or the vaccine  YES had chicken pox   Have you had a sexual partner who is HIV positive No    Ever had any other serious infectious disease No                Risk Assessment     Average Risk Category  No significant risk factors: Yes    At Risk Category (up to 3)  Teen  pregnancy: No  Poor social situation: No  Domestic abuse: No  Financial difficulties: No  Smoker: No  H/O  deliver: No  H/O drug abuse: No  Non-English speaking: Yes  Advanced maternal age: No  GDM risks: No  Previous C/S: No  H/O PIH: No  H/O STIs: No  H/O mental health concerns: No  Onset care > 20 weeks: No      High Risk Category (4 or more At Risk or)  Diabetes/GDM: No  Multiple gestation: No  Chronic hypertension: No  Significant hx of asthma: No  Fetal demise > 20 weeks: No  Positive tox screen: No  Current mental health treatment: No    Risk: At Risk   Date determined: 8/10/20

## 2020-08-11 PROBLEM — Z60.3 LANGUAGE BARRIER, CULTURAL DIFFERENCES: Status: ACTIVE | Noted: 2020-08-10

## 2020-08-11 PROBLEM — O09.90 SUPERVISION OF HIGH RISK PREGNANCY, ANTEPARTUM: Status: ACTIVE | Noted: 2020-08-10

## 2020-08-11 LAB
ABO + RH BLD: NORMAL
BLD GP AB SCN SERPL QL: NEGATIVE
CULTURE: NORMAL
HBV SURFACE AB SER-ACNC: POSITIVE M[IU]/ML
HBV SURFACE AG SERPL QL IA: NEGATIVE
REPEAT ABO/RH TYPING (HML): NORMAL
RUBV IGG SERPL QL IA: POSITIVE
TREPONEMA ANTIBODY (SYPHILIS): NEGATIVE

## 2020-08-11 NOTE — NURSING NOTE
Family Medicine OB Education    I provided the following OB education to Lucia Covington.    Discussed that Dr Tafoya should be the doctor that she sees for her prenatal visits and that Dr Tafoya will try hard to be the one to deliver her baby.  Discussed that if Dr Tafoya was unavailable that one of our other physicians would deliver the baby and that this could be a male or female provider.  Briefly discussed residency program and that multiple doctors would be present at time of delivery.  Sheet given and discussed fetal growth and development.  Sheet given and discussed warning signs with reasons to call clinic, L&D, or 24 hr HE  line with questions or concerns (phone numbers given).  Sheet given and discussed Tdap to be given after 27 weeks gestation and the importance of family members get immunized before delivery.  Proof of pregnancy completed and given to pt.  Gave pt preregistration form for hospital to complete.  See questionnaire and pregnancy risk assessment for further information in provider encounter.     Legal Screener completed and there were no concerns for government benefits, housing, or immigration.      Name of provider who requested the OB education: Dr Tafoya  Name of provider on site (faculty or community preceptor) at the time of performing the OB education: Dr Aakash Macias, RN, BSN

## 2020-08-12 LAB — VZV IGG SER QL IF: POSITIVE

## 2020-08-13 LAB
C TRACH RRNA CVX QL NAA+PROBE: NEGATIVE
COLLECTION METHOD: NORMAL
LEAD BLD-MCNC: NORMAL UG/DL
LEAD BLDV-MCNC: <2 UG/DL
N GONORRHOEA RRNA SPEC QL NAA+PROBE: NEGATIVE

## 2020-09-23 ENCOUNTER — OFFICE VISIT (OUTPATIENT)
Dept: FAMILY MEDICINE | Facility: CLINIC | Age: 20
End: 2020-09-23

## 2020-09-23 VITALS
BODY MASS INDEX: 20.81 KG/M2 | WEIGHT: 106 LBS | TEMPERATURE: 98.7 F | SYSTOLIC BLOOD PRESSURE: 99 MMHG | OXYGEN SATURATION: 99 % | HEART RATE: 82 BPM | DIASTOLIC BLOOD PRESSURE: 65 MMHG | HEIGHT: 60 IN | RESPIRATION RATE: 16 BRPM

## 2020-09-23 DIAGNOSIS — O09.90 SUPERVISION OF HIGH RISK PREGNANCY, ANTEPARTUM: Primary | ICD-10-CM

## 2020-09-23 DIAGNOSIS — Z23 NEED FOR PROPHYLACTIC VACCINATION AND INOCULATION AGAINST INFLUENZA: ICD-10-CM

## 2020-09-23 DIAGNOSIS — Z60.3 LANGUAGE BARRIER, CULTURAL DIFFERENCES: ICD-10-CM

## 2020-09-23 SDOH — SOCIAL STABILITY - SOCIAL INSECURITY: ACCULTURATION DIFFICULTY: Z60.3

## 2020-09-23 ASSESSMENT — MIFFLIN-ST. JEOR: SCORE: 1176.06

## 2020-09-23 NOTE — PROGRESS NOTES
"Subjective  Concerns: No concerns. Taking prenatal vitamins. Has not been taking vitamin B6 because no longer experiencing nausea/vomiting.     ROS:  No - Headache  No - Changes in vision  No - Chest Pain  No - Shortness of Breath  No - Nausea   No - Vomiting  No - Abdominal pain   No - Contractions  No - Dysuria   No - Vaginal Discharge    No - Vaginal bleeding   No - Loss of Fluid   No - Extremity swelling     Patient Active Problem List   Diagnosis     Supervision of high risk pregnancy, antepartum     Language barrier, cultural differences       Lucia Covington speaks Citizen of [place] so an  was used today.    Guidance:  signs of miscarriage  OTC medications  genetic testing  weight gain and exercise  fetal survey ultrasound    [facility]? Yes      Objective  BP 99/65 (BP Location: Left arm, Patient Position: Sitting, Cuff Size: Adult Regular)   Pulse 82   Temp 98.7  F (37.1  C) (Oral)   Resp 16   Ht 1.53 m (5' 0.24\")   Wt 48.1 kg (106 lb)   LMP 2020 (Approximate)   SpO2 99%   BMI 20.54 kg/m    No distress.  Gravid abdomen.  FHT 156bpm.  Fundal height below umbilicus.  no edema.    Results  Blood type: O POS  No results found for any visits on 20.    Assessment & Plan  19 year old  at 14w4d with SG Mar 20, 2021 based on LMP and early week US  Discussed weight loss of 4lbs, trying to eat every hour and not gaining weight. Nausea and vomiting have resolved. Sometimes, if does not like the smell of the food, will have nausea. Eating meat, fruits and vegetables.     Lucia was seen today for prenatal care and weight check.    Diagnoses and all orders for this visit:    Supervision of high risk pregnancy, antepartum    Language barrier, cultural differences        Weight gain adequate: -1.814 kg (-4 lb) to date, out of recommended total of 25-35 lbs (pregravid BMI 18.5-24.9)    Patient Instructions     Great to see you today!    Let's see each other in 4 weeks and to check your weight. " Keep taking prenatal vitamins.    We will schedule your fetal survey ultrasound during your next visit.    Take care,  Dr. Tafoya    Patient Education     Pregnancy: Your Second Trimester Changes  Each day, you and your baby are changing and growing together. Here s a quick look at what s happening to both of you.  How you are changing  Even when you don t notice it, your body is adapting to meet the needs of your growing baby. The changes in your body might also affect your moods.  Your body  Your uterus expands as baby grows. As the weeks go by, you will feel more pressure on your bladder, stomach, and other organs. You may notice some skin color changes on your forehead, nose, or cheeks. Freckles may darken, and moles may grow. You may notice a darker line on your abdomen between your belly button and pubic bone in the midline.  Your moods  The second trimester is often easier than the first. Still, be prepared for mood swings. These are due to the increase in hormones (chemicals that affect the way organs work) produced by your body. These mood swings are a normal part of pregnancy.  How your baby is growing          Month 4  Baby s heartbeat may be heard with a Doppler (hand-held ultrasound device) by 9 to 10 weeks. Eyebrows, eyelashes, and fingernails begin to form.  Month 5  You may feel your baby move. After a growth spurt, your baby nears 10 inches. Month 6  Baby s fingerprints have formed. Your baby weighs about 1 to 2 pounds and is about 12 inches long.   Date Last Reviewed: 1/1/2018 2000-2019 [employer]. [address] [place] [address], [place] [address]. All rights reserved. This information is not intended as a substitute for professional medical care. Always follow your healthcare professional's instructions.               Return to clinic in 4 weeks. We will discuss fetal survey ultrasound at that time.     Linda Tafoya MD PGY3  [facility]    I precepted today with Miles Richey  MD.

## 2020-09-23 NOTE — PATIENT INSTRUCTIONS
Great to see you today!    Let's see each other in 4 weeks and to check your weight. Keep taking prenatal vitamins.    We will schedule your fetal survey ultrasound during your next visit.    Take care,  Dr. Tafoya    Patient Education     Pregnancy: Your Second Trimester Changes  Each day, you and your baby are changing and growing together. Here s a quick look at what s happening to both of you.  How you are changing  Even when you don t notice it, your body is adapting to meet the needs of your growing baby. The changes in your body might also affect your moods.  Your body  Your uterus expands as baby grows. As the weeks go by, you will feel more pressure on your bladder, stomach, and other organs. You may notice some skin color changes on your forehead, nose, or cheeks. Freckles may darken, and moles may grow. You may notice a darker line on your abdomen between your belly button and pubic bone in the midline.  Your moods  The second trimester is often easier than the first. Still, be prepared for mood swings. These are due to the increase in hormones (chemicals that affect the way organs work) produced by your body. These mood swings are a normal part of pregnancy.  How your baby is growing          Month 4  Baby s heartbeat may be heard with a Doppler (hand-held ultrasound device) by 9 to 10 weeks. Eyebrows, eyelashes, and fingernails begin to form.  Month 5  You may feel your baby move. After a growth spurt, your baby nears 10 inches. Month 6  Baby s fingerprints have formed. Your baby weighs about 1 to 2 pounds and is about 12 inches long.   Date Last Reviewed: 1/1/2018 2000-2019 [employer]. [address] [place] [address], [place] [address]. All rights reserved. This information is not intended as a substitute for professional medical care. Always follow your healthcare professional's instructions.

## 2020-09-23 NOTE — NURSING NOTE
Due to patient being non-English speaking/uses sign language, an  was used for this visit. Only for face-to-face interpretation by an external agency, date and length of interpretation can be found on the scanned worksheet.     name: Jose Covington  Agency: [employer]  Language: Carole   Telephone number: [phone]   Type of interpretation: Telephone, spoken

## 2020-09-24 NOTE — PROGRESS NOTES
Preceptor Attestation:    Patient seen and evaluated in person. I discussed the patient with the resident. I have verified the content of the note, which accurately reflects my assessment of the patient and the plan of care.   Supervising Physician:  Miles Richey MD.

## 2020-11-06 ENCOUNTER — OFFICE VISIT (OUTPATIENT)
Dept: FAMILY MEDICINE | Facility: CLINIC | Age: 20
End: 2020-11-06
Payer: COMMERCIAL

## 2020-11-06 VITALS
BODY MASS INDEX: 21.79 KG/M2 | DIASTOLIC BLOOD PRESSURE: 69 MMHG | RESPIRATION RATE: 16 BRPM | WEIGHT: 115.4 LBS | HEART RATE: 82 BPM | OXYGEN SATURATION: 96 % | TEMPERATURE: 98.3 F | HEIGHT: 61 IN | SYSTOLIC BLOOD PRESSURE: 105 MMHG

## 2020-11-06 DIAGNOSIS — Z60.3 LANGUAGE BARRIER, CULTURAL DIFFERENCES: ICD-10-CM

## 2020-11-06 DIAGNOSIS — O09.90 SUPERVISION OF HIGH RISK PREGNANCY, ANTEPARTUM: Primary | ICD-10-CM

## 2020-11-06 PROCEDURE — 99207 PR PRENATAL VISIT: CPT | Mod: GC | Performed by: STUDENT IN AN ORGANIZED HEALTH CARE EDUCATION/TRAINING PROGRAM

## 2020-11-06 SDOH — SOCIAL STABILITY - SOCIAL INSECURITY: ACCULTURATION DIFFICULTY: Z60.3

## 2020-11-06 ASSESSMENT — MIFFLIN-ST. JEOR: SCORE: 1219.33

## 2020-11-06 NOTE — PROGRESS NOTES
"Subjective  Concerns: none    The nausea has subsided and she has been able to eat more. She has some dizziness about once a week.    ROS:  No - Headache  No - Changes in vision  No - Chest Pain  No - Shortness of Breath  No - Nausea   No - Vomiting  No - Abdominal pain   No - Contractions  No - Dysuria   No - Vaginal Discharge    No - Vaginal bleeding   No - Loss of Fluid   No - Extremity swelling    Present - Fetal movement       Patient Active Problem List   Diagnosis     Supervision of high risk pregnancy, antepartum     Language barrier, cultural differences       Lucia Covington speaks [place] so an  was used today.    Guidance:  weight gain and exercise, fetal survey ultrasound    Going to [facility]? Yes    Objective  /69 (BP Location: Left arm, Patient Position: Sitting, Cuff Size: Adult Regular)   Pulse 82   Temp 98.3  F (36.8  C) (Oral)   Resp 16   Ht 1.539 m (5' 0.59\")   Wt 52.3 kg (115 lb 6.4 oz)   LMP 2020 (Approximate)   SpO2 96%   BMI 22.10 kg/m    No distress.  Gravid abdomen.  FHT 160bpm.  Fundal height 19.5 cm.  no edema.    Results  Blood type: O POS  No results found for any visits on 20.    Assessment & Plan  20 year old  at 20w6d with SG Mar 20, 2021 based on LMP and early week US. Discussed drinking enough water for dizziness. Due for fetal ultrasound. Return in 4 weeks for 24-week OB visit. Will test for gestational diabetes at that time.     Lucia was seen today for prenatal care.    Diagnoses and all orders for this visit:    Supervision of high risk pregnancy, antepartum  -     US OB > 14 Weeks; Future    Language barrier, cultural differences      Weight gain adequate: 2.449 kg (5 lb 6.4 oz) to date, out of recommended total of 25-35 lbs (pregravid BMI 18.5-24.9)    Patient Instructions   1. Supervision of high risk pregnancy, antepartum  - US OB > 14 Weeks; Future    Please be seen back in 4 weeks for return OB visit.    Take careDr. " Michelet          Return to clinic in 4 weeks.  Patient and plan discussed with Dr. Damien Shea.    Delia Rodríguez, MS3    I, Linda Tafoya MD PGY3, was present with the medical student who participated in the service and in the documentation of this note. I have verified the history and personally performed the physical exam and medical decision making, and have verified the content of the note, which accurately reflects my assessment of the patient and the plan of care.      Linda Tafoya MD PGY3  [facility]    I precepted today with Damien Shea MD.

## 2020-11-06 NOTE — PATIENT INSTRUCTIONS
1. Supervision of high risk pregnancy, antepartum  - US OB > 14 Weeks; Future    Please be seen back in 4 weeks for return OB visit.    Take care,  Dr. Tafoya

## 2020-11-06 NOTE — NURSING NOTE
Due to patient being non-English speaking/uses sign language, an  was used for this visit. Only for face-to-face interpretation by an external agency, date and length of interpretation can be found on the scanned worksheet.     name: Jose Covington  Agency: [employer]  Language: [place]   Telephone number:    Type of interpretation: Telephone, spoken

## 2020-11-06 NOTE — PROGRESS NOTES
Preceptor Attestation:    Patient seen and evaluated in person. I discussed the patient with the resident. I have verified the content of the note, which accurately reflects my assessment of the patient and the plan of care.   Supervising Physician:  Damien Seha MD.

## 2020-11-09 ENCOUNTER — ANCILLARY PROCEDURE (OUTPATIENT)
Dept: ULTRASOUND IMAGING | Facility: CLINIC | Age: 20
End: 2020-11-09
Attending: FAMILY MEDICINE

## 2020-11-09 DIAGNOSIS — O09.90 SUPERVISION OF HIGH RISK PREGNANCY, ANTEPARTUM: ICD-10-CM

## 2020-11-09 NOTE — NURSING NOTE
Due to patient being non-English speaking/uses sign language, an  was used for this visit. Only for face-to-face interpretation by an external agency, date and length of interpretation can be found on the scanned worksheet.     name: Jose Covington  Agency: [employer]  Language: [place]   Telephone number: [phone]  Type of interpretation: Telephone, spoken

## 2020-12-07 ENCOUNTER — OFFICE VISIT (OUTPATIENT)
Dept: FAMILY MEDICINE | Facility: CLINIC | Age: 20
End: 2020-12-07
Payer: COMMERCIAL

## 2020-12-07 VITALS
TEMPERATURE: 98 F | WEIGHT: 120.6 LBS | BODY MASS INDEX: 23.1 KG/M2 | SYSTOLIC BLOOD PRESSURE: 105 MMHG | OXYGEN SATURATION: 95 % | RESPIRATION RATE: 20 BRPM | DIASTOLIC BLOOD PRESSURE: 71 MMHG | HEART RATE: 89 BPM

## 2020-12-07 DIAGNOSIS — Z60.3 LANGUAGE BARRIER, CULTURAL DIFFERENCES: ICD-10-CM

## 2020-12-07 DIAGNOSIS — Z3A.25 25 WEEKS GESTATION OF PREGNANCY: Primary | ICD-10-CM

## 2020-12-07 DIAGNOSIS — O09.90 SUPERVISION OF HIGH RISK PREGNANCY, ANTEPARTUM: Primary | ICD-10-CM

## 2020-12-07 DIAGNOSIS — Z3A.25 25 WEEKS GESTATION OF PREGNANCY: ICD-10-CM

## 2020-12-07 LAB
GLU GEST SCREEN 1HR 50G: 100 (ref 0–129)
HEMOGLOBIN: 11.9 G/DL (ref 11.7–15.7)

## 2020-12-07 PROCEDURE — 99207 PR PRENATAL VISIT: CPT | Mod: GC | Performed by: STUDENT IN AN ORGANIZED HEALTH CARE EDUCATION/TRAINING PROGRAM

## 2020-12-07 PROCEDURE — 85018 HEMOGLOBIN: CPT | Performed by: STUDENT IN AN ORGANIZED HEALTH CARE EDUCATION/TRAINING PROGRAM

## 2020-12-07 PROCEDURE — 36415 COLL VENOUS BLD VENIPUNCTURE: CPT | Performed by: STUDENT IN AN ORGANIZED HEALTH CARE EDUCATION/TRAINING PROGRAM

## 2020-12-07 PROCEDURE — 82950 GLUCOSE TEST: CPT | Performed by: STUDENT IN AN ORGANIZED HEALTH CARE EDUCATION/TRAINING PROGRAM

## 2020-12-07 RX ORDER — PRENATAL VIT/IRON FUM/FOLIC AC 27MG-0.8MG
1 TABLET ORAL DAILY
Qty: 90 TABLET | Refills: 3 | Status: SHIPPED | OUTPATIENT
Start: 2020-12-07 | End: 2021-11-18

## 2020-12-07 SDOH — SOCIAL STABILITY - SOCIAL INSECURITY: ACCULTURATION DIFFICULTY: Z60.3

## 2020-12-07 NOTE — PATIENT INSTRUCTIONS
-Everything looks great!  -Will let you know if we need to follow up on any of the blood tests today.    Schedule a visit with me (phone is okay) in 4 weeks.    Take care,    Dr. Tafoya

## 2020-12-07 NOTE — PROGRESS NOTES
Subjective  Concerns: No concerns. Expecting a baby girl.     ROS:  No - Headache  No - Changes in vision  No - Chest Pain  No - Shortness of Breath  No - Nausea   No - Vomiting  No - Abdominal pain   No - Contractions  No - Dysuria   No - Vaginal Discharge    No - Vaginal bleeding   No - Loss of Fluid   No - Extremity swelling   Present - Fetal movement     Going to WIC? Yes      Risk Assessment   Average Risk Category  No significant risk factors: No    At Risk Category (up to 3)  Teen pregnancy: No  Poor social situation: No  Domestic abuse: No  Financial difficulties: No  Smoker: No  H/O  deliver: No  H/O drug abuse: No  Non-English speaking: Yes  Advanced maternal age: No  GDM risks: No  Previous C/S: No  H/O PIH: No  H/O STIs: No  H/O mental health concerns: No  Onset care > 20 weeks: No  Other: None    High Risk Category (4 or more At Risk or)  Diabetes/GDM: No  Multiple gestation: No  Chronic hypertension: No  Significant hx of asthma: No  Fetal demise > 20 weeks: No  Positive tox screen: No  Current mental health treatment: No  Other: None    Risk: At Risk   Date determined: 2020    Patient Active Problem List   Diagnosis     Supervision of high risk pregnancy, antepartum     Language barrier, cultural differences       Lucia Covington speaks English so an  was used today.    Guidance:  Breastfeeding--breast pump would like  GDM-1 hour GTT  signs of  labor--discussed and teach back provided from patient.     Objective  /71 (BP Location: Left arm, Patient Position: Sitting, Cuff Size: Adult Regular)   Pulse 89   Temp 98  F (36.7  C) (Oral)   Resp 20   Wt 54.7 kg (120 lb 9.6 oz)   LMP 2020 (Approximate)   SpO2 95%   BMI 23.10 kg/m    No distress.  Gravid abdomen.  .  Fundal height 25 cm.  no edema.    Results  Blood type: O POS  No results found for any visits on 20.    Assessment & Plan  20 year old  at 25w2d with SG Mar 20, 2021 based on  LMP and early trimester US    Lucia was seen today for prenatal care and recheck medication.    Diagnoses and all orders for this visit:    25 weeks gestation of pregnancy  -     Hemoglobin (HGB) ([facility])  -     Syphilis Screen [place] (RPR/VDRL) ([facility])  -     Glucose Challenge  1 Hr  ([facility])    Positive pregnancy test  -     Prenatal Vit-Fe Fumarate-FA (PRENATAL MULTIVITAMIN W/IRON) 27-0.8 MG tablet; Take 1 tablet by mouth daily        Weight gain adequate: 4.808 kg (10 lb 9.6 oz) to date, out of recommended total of 25-35 lbs (pregravid BMI 18.5-24.9)    There are no Patient Instructions on file for this visit.    Return to clinic in 4 weeks.    Linda Tafoya MD PGY3  [facility]    I precepted today with Franklin Durand MD.

## 2020-12-08 LAB — TREPONEMA ANTIBODY (SYPHILIS): NEGATIVE

## 2021-01-07 ENCOUNTER — VIRTUAL VISIT (OUTPATIENT)
Dept: FAMILY MEDICINE | Facility: CLINIC | Age: 21
End: 2021-01-07
Payer: COMMERCIAL

## 2021-01-07 VITALS
BODY MASS INDEX: 24.02 KG/M2 | RESPIRATION RATE: 20 BRPM | HEART RATE: 76 BPM | HEIGHT: 61 IN | TEMPERATURE: 97.7 F | SYSTOLIC BLOOD PRESSURE: 107 MMHG | DIASTOLIC BLOOD PRESSURE: 69 MMHG | OXYGEN SATURATION: 98 % | WEIGHT: 127.2 LBS

## 2021-01-07 DIAGNOSIS — O09.90 SUPERVISION OF HIGH RISK PREGNANCY, ANTEPARTUM: Primary | ICD-10-CM

## 2021-01-07 DIAGNOSIS — Z23 NEED FOR VACCINATION: ICD-10-CM

## 2021-01-07 DIAGNOSIS — Z60.3 LANGUAGE BARRIER, CULTURAL DIFFERENCES: ICD-10-CM

## 2021-01-07 PROCEDURE — 90715 TDAP VACCINE 7 YRS/> IM: CPT | Performed by: STUDENT IN AN ORGANIZED HEALTH CARE EDUCATION/TRAINING PROGRAM

## 2021-01-07 PROCEDURE — 90471 IMMUNIZATION ADMIN: CPT | Performed by: STUDENT IN AN ORGANIZED HEALTH CARE EDUCATION/TRAINING PROGRAM

## 2021-01-07 PROCEDURE — 99207 PR PRENATAL VISIT: CPT | Mod: 25 | Performed by: STUDENT IN AN ORGANIZED HEALTH CARE EDUCATION/TRAINING PROGRAM

## 2021-01-07 SDOH — SOCIAL STABILITY - SOCIAL INSECURITY: ACCULTURATION DIFFICULTY: Z60.3

## 2021-01-07 ASSESSMENT — MIFFLIN-ST. JEOR: SCORE: 1275.39

## 2021-01-07 NOTE — PATIENT INSTRUCTIONS
-Tdap today    See you in two weeks!    Take care,    Dr. Tafoya    Patient Education     Pregnancy: Your Third Trimester Changes  As the baby grows, your body changes too. You may also see signs that your body is getting ready for labor. Be patient. Within a few more weeks, your baby will be born.  How you are changing  Your body is preparing for the birth of your baby. Some of the most common changes are listed below. If you have any questions or concerns, ask your healthcare provider:    You ll gain more weight from fluids, extra blood, and fat deposits.    Your breasts will grow as your body gets ready to feed the baby. They may be more tender. You may also notice a slight yellow or white discharge from the nipples.    Discharge from your vagina may increase. This is normal.    You might see some skin color changes on your forehead, cheeks, or nose. Most of these will go away after you deliver.  How your baby is growing       Month 7  Your baby can open and close his or her eyes and weighs around 4 pounds. If born prematurely (too early), your baby would likely survive with special care. Month 8  Your baby is building up body fat and weighs around 6 pounds. Month 9  Your baby weighs nearly 7 pounds and is about 19 to 21 inches long. In other words, any day now...   [employer] last reviewed this educational content on 2/1/2018 2000-2020 The [employer]. [address] [place] [address], [place] [address]. All rights reserved. This information is not intended as a substitute for professional medical care. Always follow your healthcare professional's instructions.

## 2021-01-07 NOTE — PROGRESS NOTES
Preceptor Attestation:    Patient seen and evaluated in person. I discussed the patient with the resident. I have verified the content of the note, which accurately reflects my assessment of the patient and the plan of care.   Supervising Physician:  Vick Kahn MD.

## 2021-01-07 NOTE — PROGRESS NOTES
"Subjective  Concerns: None    ROS:  No - Headache  No - Changes in vision  No - Chest Pain  No - Shortness of Breath  No - Nausea   No - Vomiting  No - Abdominal pain   No - Contractions  No - Dysuria   No - Vaginal Discharge    No - Vaginal bleeding   No - Loss of Fluid   No - Extremity swelling   Present - Fetal movement       Going to WIC? Yes      Patient Active Problem List   Diagnosis     Supervision of high risk pregnancy, antepartum     Language barrier, cultural differences       Lucia Covington speaks Australian so an  was used today.    Guidance:  fetal growth and movement  RhoGAM--not needed, O positive  signs of  labor--discussed    Do you need help getting a car seat? YES  Do you need help getting a breast pump? YES      Objective  /69 (BP Location: Right arm, Patient Position: Sitting, Cuff Size: Adult Regular)   Pulse 76   Temp 97.7  F (36.5  C) (Oral)   Resp 20   Ht 1.543 m (5' 0.75\")   Wt 57.7 kg (127 lb 3.2 oz)   LMP 2020 (Approximate)   SpO2 98%   BMI 24.23 kg/m    No distress.  Gravid abdomen.  FHT 140s.  Fundal height 28.5 cm.  no edema.    Results  Blood type: O POS  No results found for any visits on 21.    Assessment & Plan  20 year old  at 29w5d with SG Mar 20, 2021 based on LMP and first trimester US.    1. Supervision of high risk pregnancy, antepartum  2. Language barrier, cultural differences  Normal one hour GCT. Normal hemoglobin. Taking prenatals. Tdap today.   -Return to clinic in 2 weeks.     Weight gain adequate: 7.802 kg (17 lb 3.2 oz) to date, out of recommended total of 25-35 lbs (pregravid BMI 18.5-24.9)    There are no Patient Instructions on file for this visit.    Return to clinic in 2 weeks.    Linda Tafoya MD PGY3  [facility]    I precepted today with Vick Kahn MD.    "

## 2021-01-07 NOTE — NURSING NOTE
Due to patient being non-English speaking/uses sign language, an  was used for this visit. Only for face-to-face interpretation by an external agency, date and length of interpretation can be found on the scanned worksheet.     name: Jose Covington  Agency: [employer]  Language: Sri Lankan   Telephone number: [phone]  Type of interpretation: Telephone, spoken

## 2021-02-22 ENCOUNTER — OFFICE VISIT (OUTPATIENT)
Dept: FAMILY MEDICINE | Facility: CLINIC | Age: 21
End: 2021-02-22
Payer: COMMERCIAL

## 2021-02-22 VITALS
OXYGEN SATURATION: 97 % | SYSTOLIC BLOOD PRESSURE: 119 MMHG | TEMPERATURE: 98.5 F | DIASTOLIC BLOOD PRESSURE: 84 MMHG | WEIGHT: 143.6 LBS | BODY MASS INDEX: 27.36 KG/M2 | RESPIRATION RATE: 16 BRPM | HEART RATE: 88 BPM

## 2021-02-22 DIAGNOSIS — R30.0 DYSURIA: ICD-10-CM

## 2021-02-22 DIAGNOSIS — O09.90 SUPERVISION OF HIGH RISK PREGNANCY, ANTEPARTUM: Primary | ICD-10-CM

## 2021-02-22 DIAGNOSIS — Z60.3 LANGUAGE BARRIER, CULTURAL DIFFERENCES: ICD-10-CM

## 2021-02-22 LAB
BACTERIA: NORMAL
BILIRUBIN UR: NEGATIVE MG/DL
BLOOD UR: NEGATIVE MG/DL
CASTS: NORMAL /LPF
CRYSTAL URINE: NORMAL /LPF
EPITHELIAL CELLS UR: NORMAL /LPF (ref 0–2)
GLUCOSE URINE: NEGATIVE
KETONES UR QL: NEGATIVE MG/DL
LEUKOCYTE ESTERASE UR: ABNORMAL
MUCOUS URINE: NORMAL LPF
NITRITE UR QL STRIP: NEGATIVE MG/DL
PH UR STRIP: 7 [PH] (ref 4.5–8)
PROTEIN UR: NEGATIVE MG/DL
RBC URINE: NORMAL /HPF
SP GR UR STRIP: 1.02 (ref 1–1.03)
UROBILINOGEN UR STRIP-ACNC: ABNORMAL E.U./DL
WBC URINE: NORMAL /HPF

## 2021-02-22 PROCEDURE — 81001 URINALYSIS AUTO W/SCOPE: CPT | Performed by: STUDENT IN AN ORGANIZED HEALTH CARE EDUCATION/TRAINING PROGRAM

## 2021-02-22 PROCEDURE — 99207 PR PRENATAL VISIT: CPT | Mod: GC | Performed by: STUDENT IN AN ORGANIZED HEALTH CARE EDUCATION/TRAINING PROGRAM

## 2021-02-22 SDOH — SOCIAL STABILITY - SOCIAL INSECURITY: ACCULTURATION DIFFICULTY: Z60.3

## 2021-02-22 NOTE — NURSING NOTE
Due to patient being non-English speaking/uses sign language, an  was used for this visit. Only for face-to-face interpretation by an external agency, date and length of interpretation can be found on the scanned worksheet.     name: Jose Covington  Agency: [employer]  Language: Carole   Telephone number: [phone]  Type of interpretation: Telephone, spoken

## 2021-02-22 NOTE — PROGRESS NOTES
Preceptor Attestation:    I discussed the patient with the resident and evaluated the patient in person. I have verified the content of the note, which accurately reflects my assessment of the patient and the plan of care.   Supervising Physician:  Damien Shea MD.

## 2021-02-22 NOTE — PROGRESS NOTES
Assessment & Plan  20 year old  at 36w2d with SG Mar 20, 2021 based on 8w2d US. Has had no complications this pregnancy. Obtained GBS swab today.     Lucia was seen today for prenatal care and other.    Diagnoses and all orders for this visit:    Supervision of high risk pregnancy, antepartum  -     Clt. Grp B Strp Screen ([facility])  -     Urine Microscopic ([facility])    Language barrier, cultural differences  -     Urine Microscopic ([facility])    Dysuria  -     Urinalysis, Micro If ([facility])  -     Urine Microscopic ([facility])        Weight gain adequate: 15.2 kg (33 lb 9.6 oz) to date, out of recommended total of 25-35 lbs (pregravid BMI 18.5-24.9)    Patient Instructions   -Swabbed for group B strep  -Will collect a urine to check and make sure you do not have a urine infection    Please return to clinic in one week!    See you soon,    Dr. Tafoya      Return to clinic in 2 weeks.    Linda Tafoya MD  I precepted today with Damien Shea MD.    Linda Tafoya MD PGY3  [facility]      Subjective  Concerns: Having more aches and pains with hand issues at night, fingers are sticking to each other    ROS:  YES (has been having tension headaches throughout pregnancy, intermittent)- Headache  No - Changes in vision  No - Chest Pain  No - Shortness of Breath  No - Nausea   No - Vomiting  No - Abdominal pain   YES ([place]) - Contractions  YES - Dysuria   No - Vaginal Discharge    No - Vaginal bleeding   No - Loss of Fluid   YES (last month started to notice, not very bad) - Extremity swelling   Present - Fetal movement       Going to WIC? Yes      Patient Active Problem List   Diagnosis     Supervision of high risk pregnancy, antepartum     Language barrier, cultural differences       Lucia Covington speaks Libyan so an  was used today.    Guidance:  birth control--discussed, wants to keep thinking about it  Travel--No trips planned.   warning signs/PIH--    Do you need help getting a car seat?  No  Do you need help getting a breast pump? No      Objective  /84 (BP Location: Left arm, Patient Position: Sitting, Cuff Size: Adult Regular)   Pulse 88   Temp 98.5  F (36.9  C) (Oral)   Resp 16   Wt 65.1 kg (143 lb 9.6 oz)   LMP 05/29/2020 (Approximate)   SpO2 97%   BMI 27.36 kg/m    No distress.  Gravid abdomen.  .  Fundal height 36 cm.  no edema.    Results  Blood type: O POS  Results for orders placed or performed in visit on 02/22/21   Clt. Grp B Strp Screen ([facility])     Status: None   Result Value Ref Range    Group B Strep Antigen Negative Negative    Allergic To Penicillin No     Narrative    Test performed by:  [facility] [address] [place] [address]  Intended use:  The [employer] Xpert GBS LB Assay, performed on the [employer]   Instrument   Systems, is a qualitative in vitro diagnostic test designed to detect Group B   Streptococcus (GBS) DNA from enriched vaginal/rectal swab specimens, using   fully automated realtime polymerase chain reaction (PCR) with fluorogenic   detection of the amplified DNA. Xpert GBS LB Assay testing is indicated as an   aid in determining GBS colonization status in antepartum women. This assay   does not diagnose or monitor treatment for GBS infections.  The [employer] Xpert   GBS LB Assay is intended for use in hospital, reference or state laboratory   settings.  The device is not intended for point-of-care use.  Methodology:  The [employer] Systems automate and integrate sample lysis, nucleic   acid purification and amplification, and detection of the target sequence in   simple or complex samples using real-time polymerase chain reaction (PCR). The   GBS primers and probe detect a target within a 3' DNA region adjacent to the   cfb gene of S. agalactiae. A fluorescent signal becomes detected and increases   each time the specific DNA strand is amplified. The Real-time PCR generates a   growth curve with  number of cycles on the x-axis and fluorescence on the   y-axis. If the organismï  s DNA is not detected by the real-time PCR reaction   the growth curve will be flat and will be resulted as negative.   Urinalysis, Micro If ([facility])     Status: Abnormal   Result Value Ref Range    Specific Gravity Urine 1.020 1.005 - 1.030    pH Urine 7.0 4.5 - 8.0    Leukocyte Esterase UR 1+ (A) NEGATIVE    Nitrite Urine Negative NEGATIVE mg/dL    Protein UR Negative NEGATIVE mg/dL    Glucose Urine Negative NEGATIVE    Ketones Urine Negative NEGATIVE mg/dL    Urobilinogen mg/dL 0.2 E.U./dL 0.2 E.U./dL E.U./dL    Bilirubin UR Negative NEGATIVE mg/dL    Blood UR Negative NEGATIVE mg/dL   Urine Microscopic ([facility])     Status: None   Result Value Ref Range    WBC Urine 10-25 <5 /hpf    RBC Urine None <5 /hpf    Epithelial Cells UR 5-10 0 - 2 /lpf    Mucous Urine None NONE lpf    Casts Urine None NONE /lpf    Crystal Urine None NONE /lpf    Bacteria Wet Prep Few None

## 2021-02-23 LAB
ALLERGIC TO PENICILLIN: NO
GP B STREP AG SPEC QL LA: NEGATIVE

## 2021-03-01 ENCOUNTER — OFFICE VISIT (OUTPATIENT)
Dept: FAMILY MEDICINE | Facility: CLINIC | Age: 21
End: 2021-03-01
Payer: COMMERCIAL

## 2021-03-01 VITALS
RESPIRATION RATE: 16 BRPM | OXYGEN SATURATION: 97 % | TEMPERATURE: 98.3 F | HEART RATE: 92 BPM | WEIGHT: 141.4 LBS | SYSTOLIC BLOOD PRESSURE: 110 MMHG | BODY MASS INDEX: 26.94 KG/M2 | DIASTOLIC BLOOD PRESSURE: 74 MMHG

## 2021-03-01 DIAGNOSIS — Z60.3 LANGUAGE BARRIER, CULTURAL DIFFERENCES: ICD-10-CM

## 2021-03-01 DIAGNOSIS — O09.90 SUPERVISION OF HIGH RISK PREGNANCY, ANTEPARTUM: Primary | ICD-10-CM

## 2021-03-01 PROCEDURE — 99207 PR PRENATAL VISIT: CPT | Mod: GC | Performed by: STUDENT IN AN ORGANIZED HEALTH CARE EDUCATION/TRAINING PROGRAM

## 2021-03-01 SDOH — SOCIAL STABILITY - SOCIAL INSECURITY: ACCULTURATION DIFFICULTY: Z60.3

## 2021-03-01 NOTE — PROGRESS NOTES
Preceptor Attestation:   Patient seen, evaluated and discussed with the resident. I have verified the content of the note, which accurately reflects my assessment of the patient and the plan of care.   Supervising Physician:  Morenita Cruz MD

## 2021-03-01 NOTE — PROGRESS NOTES
Assessment & Plan  Lucia is a 20 year old  at 37w2d with SG Mar 20, 2021 based on 8w2d US. No complications with this pregnancy. Lucia was seen today for prenatal care.     Lucia was seen today for prenatal care and medication reconciliation.    Diagnoses and all orders for this visit:    Supervision of high risk pregnancy, antepartum    Language barrier, cultural differences    GBS negative.     Weight gain adequate: 14.2 kg (31 lb 6.4 oz) to date, out of recommended total of 25-35 lbs (pregravid BMI 18.5-24.9)    Patient Instructions     Delivery Plan     I NEED A [place]      Take me to: [facility]  Phone number: [phone]    My name is: Lucia Covington  : 2000    My Doctor is: Linda Tafoya MD   Attending Physician: [facility]  Prenatal care was at [facility] [phone].    20 year old         -para:   Estimated Date of Delivery: Mar 20, 2021  At 37w2d on 2021  Delivery type: Vaginal Delivery    Patient Active Problem List   Diagnosis     Supervision of high risk pregnancy, antepartum     Language barrier, cultural differences     Allergies:No Known Allergies    Lab Results:  Blood Type: O POS  GBS:negative Date completed: 21    Rubella IgG   Date/Time Value Ref Range Status   08/10/2020 10:00 AM Positive  Final     HIV Antigen/Antibody   Date/Time Value Ref Range Status   08/10/2020 10:00 AM Negative Negative Final     No results found for: N5UV  Hepatitis B Surface Antigen   Date/Time Value Ref Range Status   08/10/2020 10:00 AM Negative Negative Final     Hemoglobin   Date Value Ref Range Status   2020 11.9 11.7 - 15.7 g/dL Final   08/10/2020 13.0 11.7 - 15.7 g/dL Final       Last cervical check: 2021 Cervical Dilation: Deferred. Vertex confirmed on US on 3/1/21.     Immunization History   Administered Date(s) Administered     HEPA 10/14/2015, 2016     HPV 2012, 2013,  06/09/2014     HPV Quadrivalent 11/23/2012, 06/12/2013     Hep B, Peds or Adolescent 10/01/2012, 06/12/2013     HepA-Adult 10/14/2015     HepA-ped 2 Dose 11/04/2016     HepB 05/22/2012, 10/01/2012, 06/12/2013     HepB, Unspecified 05/22/2012     Influenza (IIV3) PF 10/23/2012, 10/17/2013     Influenza Intranasal Vaccine 10/23/2012     Influenza Intranasal Vaccine 4 valent 10/17/2013     Influenza Vaccine IM > 6 months Valent IIV4 09/23/2020     Influenza Vaccine, 6+MO IM (QUADRIVALENT W/PRESERVATIVES) 11/26/2014, 10/14/2015, 11/04/2016, 11/08/2017, 10/19/2018, 10/18/2019     MMR 05/22/2012, 08/31/2012, 10/23/2012     MMR/V 10/23/2012     Meningococcal (Menactra ) 10/23/2012, 10/14/2015     Meningococcal (Menomune ) 10/23/2012     Poliovirus, inactivated (IPV) 10/01/2012, 11/23/2012, 06/12/2013     Td (Adult), Adsorbed 05/22/2012     Tdap (Adacel,Boostrix) 05/22/2012, 10/01/2012, 06/12/2013, 01/07/2021     Varicella 10/01/2012, 10/23/2012, 10/14/2015         Immunizations needed postpartum: None    Who will be present at the delivery? Depends on days and time: Father of baby or sister. Maria Ferrara EMAo. Sister's name: Lukas Angel.     Do you have a ?No If no, would you like one?     What are your plans for pain control? Natural    Who will cut the umbilical cord? Optional     Do you plan to breastfeed? Yes    If your baby is a boy, would you like him circumcised?N/A    Name of baby's clinic: [facility]    Would you like your baby to have the recommended vitamin K shot to prevent bleeding, Hepatitis B shot, and eye ointment to prevent eye infections?Yes        Next Appointment is: 1 week        Return to clinic in 1 week.    Serenity Olson, MS3    I precepted today with Morenita Cruz MD.}    I, Linda Tafoya MD PGY3, was present with the medical student who participated in the service and in the documentation of this note. I have verified the history and personally performed the physical exam and medical  decision making, and have verified the content of the note, which accurately reflects my assessment of the patient and the plan of care.    Linda Tafoya MD PGY3  [facility]      Subjective  Concerns: Patient reports some swelling in both feet, along with numbness and tingling in her toes when she has been sitting for long periods. She has felt some contractions and fetal movements over the past week.       ROS:  No - Headache  No - Changes in vision  No - Chest Pain  No - Shortness of Breath  No - Nausea   No - Vomiting  No - Abdominal pain   YES - Contractions   YES - Dysuria, intermittent, 1/wk  No - Vaginal Discharge    No - Vaginal bleeding   No - Loss of Fluid   YES - Extremity swelling   Present - Fetal movement       Going to WIC? Yes    Patient Active Problem List   Diagnosis     Supervision of high risk pregnancy, antepartum     Language barrier, cultural differences       Lucia Covington speaks Barbadian so an  was used today.    Guidance:  post-partum care  GBS  signs of labor  pain control during labor    Do you need help getting a car seat? YES  Do you need help getting a breast pump? YES    Objective  /74 (BP Location: Left arm, Patient Position: Sitting, Cuff Size: Adult Regular)   Pulse 92   Temp 98.3  F (36.8  C) (Oral)   Resp 16   Wt 64.1 kg (141 lb 6.4 oz)   LMP 05/29/2020 (Approximate)   SpO2 97%   BMI 26.94 kg/m    No distress.  Gravid abdomen.  .  Fundal height 37 cm.  trace edema.  Cervix: not examined    Results  Blood type: O POS  No results found for any visits on 03/01/21.

## 2021-03-01 NOTE — NURSING NOTE
Due to patient being non-English speaking/uses sign language, an  was used for this visit. Only for face-to-face interpretation by an external agency, date and length of interpretation can be found on the scanned worksheet.     name: Jose Covington  Agency: [employer]  Language: Carole   Telephone number: [phone]  Type of interpretation: Telephone, spoken

## 2021-03-01 NOTE — PATIENT INSTRUCTIONS
Delivery Plan     I NEED A [place]      Take me to: [facility]  Phone number: [phone]    My name is: Lucia Covington  : 2000    My Doctor is: Linda Tafoya MD   Attending Physician: [facility]  Prenatal care was at [facility] [phone].    20 year old         -para:   Estimated Date of Delivery: Mar 20, 2021  At 37w2d on 2021  Delivery type: Vaginal Delivery    Patient Active Problem List   Diagnosis     Supervision of high risk pregnancy, antepartum     Language barrier, cultural differences     Allergies:No Known Allergies    Lab Results:  Blood Type: O POS  GBS:negative Date completed: 21    Rubella IgG   Date/Time Value Ref Range Status   08/10/2020 10:00 AM Positive  Final     HIV Antigen/Antibody   Date/Time Value Ref Range Status   08/10/2020 10:00 AM Negative Negative Final     No results found for: N5UV  Hepatitis B Surface Antigen   Date/Time Value Ref Range Status   08/10/2020 10:00 AM Negative Negative Final     Hemoglobin   Date Value Ref Range Status   2020 11.9 11.7 - 15.7 g/dL Final   08/10/2020 13.0 11.7 - 15.7 g/dL Final       Last cervical check: 2021 Cervical Dilation: Deferred. Vertex confirmed on US on 3/1/21.     Immunization History   Administered Date(s) Administered     HEPA 10/14/2015, 2016     HPV 2012, 2013, 2014     HPV Quadrivalent 2012, 2013     Hep B, Peds or Adolescent 10/01/2012, 2013     HepA-Adult 10/14/2015     HepA-ped 2 Dose 2016     HepB 2012, 10/01/2012, 2013     HepB, Unspecified 2012     Influenza (IIV3) PF 10/23/2012, 10/17/2013     Influenza Intranasal Vaccine 10/23/2012     Influenza Intranasal Vaccine 4 valent 10/17/2013     Influenza Vaccine IM > 6 months Valent IIV4 2020     Influenza Vaccine, 6+MO IM (QUADRIVALENT W/PRESERVATIVES) 2014, 10/14/2015, 2016, 2017,  10/19/2018, 10/18/2019     MMR 05/22/2012, 08/31/2012, 10/23/2012     MMR/V 10/23/2012     Meningococcal (Menactra ) 10/23/2012, 10/14/2015     Meningococcal (Menomune ) 10/23/2012     Poliovirus, inactivated (IPV) 10/01/2012, 11/23/2012, 06/12/2013     Td (Adult), Adsorbed 05/22/2012     Tdap (Adacel,Boostrix) 05/22/2012, 10/01/2012, 06/12/2013, 01/07/2021     Varicella 10/01/2012, 10/23/2012, 10/14/2015         Immunizations needed postpartum: None    Who will be present at the delivery? Depends on days and time: Father of baby or sister. Maria Preston. Sister's name: Lukas Angel.     Do you have a ?No If no, would you like one?     What are your plans for pain control? Natural    Who will cut the umbilical cord? Optional     Do you plan to breastfeed? Yes    If your baby is a boy, would you like him circumcised?N/A    Name of baby's clinic: [facility]    Would you like your baby to have the recommended vitamin K shot to prevent bleeding, Hepatitis B shot, and eye ointment to prevent eye infections?Yes        Next Appointment is: 1 week

## 2021-03-04 NOTE — PROGRESS NOTES
3/4/21 Requested car seat for pt online through [employer].  They will contact pt once they are ready to deliver to arrange time for delivery and instruct pt on how to use car seat (usually takes 3 weeks from the time referral received to get call for delivery)./NG

## 2021-03-11 ENCOUNTER — OFFICE VISIT (OUTPATIENT)
Dept: FAMILY MEDICINE | Facility: CLINIC | Age: 21
End: 2021-03-11
Payer: COMMERCIAL

## 2021-03-11 VITALS
SYSTOLIC BLOOD PRESSURE: 118 MMHG | RESPIRATION RATE: 16 BRPM | DIASTOLIC BLOOD PRESSURE: 76 MMHG | WEIGHT: 145.6 LBS | BODY MASS INDEX: 27.74 KG/M2 | TEMPERATURE: 98.4 F | HEART RATE: 85 BPM | OXYGEN SATURATION: 98 %

## 2021-03-11 DIAGNOSIS — J02.9 SORE THROAT: ICD-10-CM

## 2021-03-11 DIAGNOSIS — O09.90 SUPERVISION OF HIGH RISK PREGNANCY, ANTEPARTUM: Primary | ICD-10-CM

## 2021-03-11 DIAGNOSIS — Z60.3 LANGUAGE BARRIER, CULTURAL DIFFERENCES: ICD-10-CM

## 2021-03-11 PROCEDURE — 99207 PR PRENATAL VISIT: CPT | Mod: GC | Performed by: STUDENT IN AN ORGANIZED HEALTH CARE EDUCATION/TRAINING PROGRAM

## 2021-03-11 SDOH — SOCIAL STABILITY - SOCIAL INSECURITY: ACCULTURATION DIFFICULTY: Z60.3

## 2021-03-11 NOTE — PROGRESS NOTES
Assessment & Plan  20 year old  at 38w5d with SG Mar 20, 2021 based on LMP and first trimester US. GBS negative. No significant complications this pregnancy. Does have a sore throat today with evidence of a mildly enlarged tonsil on her left side. No red flag symptoms. Suggested supportive cares.     Lucia was seen today for prenatal care and medication reconciliation.    Diagnoses and all orders for this visit:    Supervision of high risk pregnancy, antepartum    Language barrier, cultural differences        Weight gain adequate: 14.2 kg (31 lb 6.4 oz) to date, out of recommended total of 25-35 lbs (pregravid BMI 18.5-24.9)    Return to clinic in 1 week.    Linda Tafoya MD PGY3  [facility]    I precepted today with Morenita Cruz MD.        Subjective  Concerns: Having a sore throat since last night. No pain when swallowing. Difficult to breathe sometimes. Feels like something is stuck.     ROS:  No - Headache  No - Changes in vision  No - Chest Pain  No - Shortness of Breath  No - Nausea   No - Vomiting  No - Abdominal pain   YES - Contractions--1-2 times per day  No - Dysuria   No - Vaginal Discharge    No - Vaginal bleeding   No - Loss of Fluid   No - Extremity swelling   Present - Fetal movement       Going to WIC? Yes      Patient Active Problem List   Diagnosis     Supervision of high risk pregnancy, antepartum     Language barrier, cultural differences       Lucia Covington speaks Citizen of [place] so an  was used today.    Guidance:  post-partum care  GBS  signs of labor  pain control during labor    Do you need help getting a car seat? No  Do you need help getting a breast pump? No      Objective  /76 (BP Location: Left arm, Patient Position: Sitting, Cuff Size: Adult Regular)   Pulse 85   Temp 98.4  F (36.9  C) (Oral)   Resp 16   Wt 66 kg (145 lb 9.6 oz)   LMP 2020 (Approximate)   SpO2 98%   BMI 27.74 kg/m    No distress.  Gravid abdomen.  .  Fundal height 39  cm.  no edema.  Cervix: not examined    Results  Blood type: O POS  No results found for any visits on 03/11/21.

## 2021-03-11 NOTE — NURSING NOTE
Due to patient being non-English speaking/uses sign language, an  was used for this visit. Only for face-to-face interpretation by an external agency, date and length of interpretation can be found on the scanned worksheet.     name: Sanjana Covington  Agency: [employer]  Language: Carole   Telephone number: [phone]  Type of interpretation: Telemedicine, spoken

## 2021-03-18 ENCOUNTER — OFFICE VISIT (OUTPATIENT)
Dept: FAMILY MEDICINE | Facility: CLINIC | Age: 21
End: 2021-03-18
Payer: COMMERCIAL

## 2021-03-18 VITALS
SYSTOLIC BLOOD PRESSURE: 124 MMHG | RESPIRATION RATE: 20 BRPM | BODY MASS INDEX: 28.19 KG/M2 | OXYGEN SATURATION: 98 % | WEIGHT: 148 LBS | TEMPERATURE: 98.1 F | HEART RATE: 85 BPM | DIASTOLIC BLOOD PRESSURE: 80 MMHG

## 2021-03-18 DIAGNOSIS — O09.90 SUPERVISION OF HIGH RISK PREGNANCY, ANTEPARTUM: Primary | ICD-10-CM

## 2021-03-18 DIAGNOSIS — Z60.3 LANGUAGE BARRIER, CULTURAL DIFFERENCES: ICD-10-CM

## 2021-03-18 PROCEDURE — 99207 PR PRENATAL VISIT: CPT | Mod: GC | Performed by: STUDENT IN AN ORGANIZED HEALTH CARE EDUCATION/TRAINING PROGRAM

## 2021-03-18 SDOH — SOCIAL STABILITY - SOCIAL INSECURITY: ACCULTURATION DIFFICULTY: Z60.3

## 2021-03-18 NOTE — PROGRESS NOTES
Assessment & Plan  20 year old  at 39w5d with SG Mar 20, 2021 based on LMP and first trimester US. GBS negative. No significant complications this pregnancy. Expecting baby girl. Has only been feeling around 1-2 contractions per day. Cervical exam today revealed that she is 1.5cm/20%/-3 with favorable position. Minimal vaginal spotting noted after exam today. Counseled that if spotting continues more than 24 hours or becomes worse, should go to [facility] for further evaluation. Also, patient able to provide teach back regarding signs of labor which include: increasing frequency/intensity of contractions, vaginal bleeding, leakage of fluids. Denies any signs of preeclampsia at this time.     Lucia was seen today for prenatal care.    Diagnoses and all orders for this visit:    Supervision of high risk pregnancy, antepartum    Language barrier, cultural differences        Weight gain adequate: 14.2 kg (31 lb 6.4 oz) to date, out of recommended total of 25-35 lbs (pregravid BMI 18.5-24.9)    Patient Instructions     Delivery Plan     I NEED A [email or web address]      Take me to: [facility]  Phone number: [phone]    My name is: Lucia Covington  : 2000    My Doctor is: Linda Tafoya MD   Attending Physician: [facility] Faculty  Prenatal care was at [facility] [phone].    20 year old         -para:   Estimated Date of Delivery: Mar 20, 2021  At 39w5d on 2021  Delivery type: Vaginal Delivery    Patient Active Problem List   Diagnosis     Supervision of high risk pregnancy, antepartum     Language barrier, cultural differences     Allergies:No Known Allergies    Lab Results:  Blood Type: O POS  GBS:negative Date completed: 21    Rubella IgG   Date/Time Value Ref Range Status   08/10/2020 10:00 AM Positive  Final     HIV Antigen/Antibody   Date/Time Value Ref Range Status   08/10/2020 10:00 AM Negative Negative Final     No results  found for: N5UV  Hepatitis B Surface Antigen   Date/Time Value Ref Range Status   08/10/2020 10:00 AM Negative Negative Final     Hemoglobin   Date Value Ref Range Status   12/07/2020 11.9 11.7 - 15.7 g/dL Final   08/10/2020 13.0 11.7 - 15.7 g/dL Final       Last cervical check: March 18, 2021 Cervical Dilation:     Immunization History   Administered Date(s) Administered     HEPA 10/14/2015, 11/04/2016     HPV 11/23/2012, 06/12/2013, 06/09/2014     HPV Quadrivalent 11/23/2012, 06/12/2013     Hep B, Peds or Adolescent 10/01/2012, 06/12/2013     HepA-Adult 10/14/2015     HepA-ped 2 Dose 11/04/2016     HepB 05/22/2012, 10/01/2012, 06/12/2013     HepB, Unspecified 05/22/2012     Influenza (IIV3) PF 10/23/2012, 10/17/2013     Influenza Intranasal Vaccine 10/23/2012     Influenza Intranasal Vaccine 4 valent 10/17/2013     Influenza Vaccine IM > 6 months Valent IIV4 09/23/2020     Influenza Vaccine, 6+MO IM (QUADRIVALENT W/PRESERVATIVES) 11/26/2014, 10/14/2015, 11/04/2016, 11/08/2017, 10/19/2018, 10/18/2019     MMR 05/22/2012, 08/31/2012, 10/23/2012     MMR/V 10/23/2012     Meningococcal (Menactra ) 10/23/2012, 10/14/2015     Meningococcal (Menomune ) 10/23/2012     Poliovirus, inactivated (IPV) 10/01/2012, 11/23/2012, 06/12/2013     Td (Adult), Adsorbed 05/22/2012     Tdap (Adacel,Boostrix) 05/22/2012, 10/01/2012, 06/12/2013, 01/07/2021     Varicella 10/01/2012, 10/23/2012, 10/14/2015         Immunizations needed postpartum: None    Who will be present at the delivery?  Depends on days and time: Father of baby or sister. Maria Preston. Sister's name: Lukas Eris Tomlinson.     Do you have a ?No If no, would you like one? No     What are your plans for pain control?Natural    Who will cut the umbilical cord? Optional    Do you plan to breastfeed?Yes    Name of baby's clinic: [facility]    Would you like your baby to have the recommended vitamin K shot to prevent bleeding, Hepatitis B shot, and eye ointment to prevent  eye infections?Yes    Next Appointment is: 1 week on 3/24/21.         Return to clinic in 1 week.    Linda Tafoya MD PGY3  [facility]    I precepted today with Charli Hou MD.      Subjective  Concerns: Has had a little bit of lower back pain that started three days ago. Has started feeling more contractions, about 1-2 times per day. Sore throat has improved.     ROS:  No - Headache  No - Changes in vision  No - Chest Pain  No - Shortness of Breath  No - Nausea   No - Vomiting  No - Abdominal pain   YES - Contractions  No - Dysuria   No - Vaginal Discharge    No - Vaginal bleeding   No - Loss of Fluid   No - Extremity swelling   Present - Fetal movement       Going to WIC? Yes      Patient Active Problem List   Diagnosis     Supervision of high risk pregnancy, antepartum     Language barrier, cultural differences       Lucia Covington speaks Zimbabwean so an  was used today.    Guidance:  post-partum care  signs of labor  pain control during labor    Do you need help getting a car seat? No  Do you need help getting a breast pump? No    Objective  /80   Pulse 85   Temp 98.1  F (36.7  C) (Oral)   Resp 20   Wt 67.1 kg (148 lb)   LMP 05/29/2020 (Approximate)   SpO2 98%   BMI 28.19 kg/m    No distress.  Gravid abdomen.  .  Fundal height 40 cm.  no edema.  Cervix: mid position and dilated to 1.5cm, 20% effacement and -3 station.     Results  Blood type: O POS  No results found for any visits on 03/18/21.

## 2021-03-18 NOTE — PATIENT INSTRUCTIONS
Delivery Plan     I NEED A Swedish      Take me to: [facility]  Phone number: [phone]    My name is: Lucia Covington  : 2000    My Doctor is: Linda Tafoya MD   Attending Physician: [facility] Faculty  Prenatal care was at [facility] [phone].    20 year old         -para:   Estimated Date of Delivery: Mar 20, 2021  At 39w5d on 2021  Delivery type: Vaginal Delivery    Patient Active Problem List   Diagnosis     Supervision of high risk pregnancy, antepartum     Language barrier, cultural differences     Allergies:No Known Allergies    Lab Results:  Blood Type: O POS  GBS:negative Date completed: 21    Rubella IgG   Date/Time Value Ref Range Status   08/10/2020 10:00 AM Positive  Final     HIV Antigen/Antibody   Date/Time Value Ref Range Status   08/10/2020 10:00 AM Negative Negative Final     No results found for: N5UV  Hepatitis B Surface Antigen   Date/Time Value Ref Range Status   08/10/2020 10:00 AM Negative Negative Final     Hemoglobin   Date Value Ref Range Status   2020 11.9 11.7 - 15.7 g/dL Final   08/10/2020 13.0 11.7 - 15.7 g/dL Final       Last cervical check: 2021 Cervical Dilation: 1.5cm/20%/-3, soft, mid position.     Immunization History   Administered Date(s) Administered     HEPA 10/14/2015, 2016     HPV 2012, 2013, 2014     HPV Quadrivalent 2012, 2013     Hep B, Peds or Adolescent 10/01/2012, 2013     HepA-Adult 10/14/2015     HepA-ped 2 Dose 2016     HepB 2012, 10/01/2012, 2013     HepB, Unspecified 2012     Influenza (IIV3) PF 10/23/2012, 10/17/2013     Influenza Intranasal Vaccine 10/23/2012     Influenza Intranasal Vaccine 4 valent 10/17/2013     Influenza Vaccine IM > 6 months Valent IIV4 2020     Influenza Vaccine, 6+MO IM (QUADRIVALENT W/PRESERVATIVES) 2014, 10/14/2015, 2016, 2017,  10/19/2018, 10/18/2019     MMR 05/22/2012, 08/31/2012, 10/23/2012     MMR/V 10/23/2012     Meningococcal (Menactra ) 10/23/2012, 10/14/2015     Meningococcal (Menomune ) 10/23/2012     Poliovirus, inactivated (IPV) 10/01/2012, 11/23/2012, 06/12/2013     Td (Adult), Adsorbed 05/22/2012     Tdap (Adacel,Boostrix) 05/22/2012, 10/01/2012, 06/12/2013, 01/07/2021     Varicella 10/01/2012, 10/23/2012, 10/14/2015         Immunizations needed postpartum: None    Who will be present at the delivery?  Depends on days and time: Father of baby or sister. Maria Preston. Sister's name: Lukas Angel.     Do you have a ?No If no, would you like one? No     What are your plans for pain control?Natural    Who will cut the umbilical cord? Optional    Do you plan to breastfeed?Yes    Name of baby's clinic: [facility]    Would you like your baby to have the recommended vitamin K shot to prevent bleeding, Hepatitis B shot, and eye ointment to prevent eye infections?Yes    Next Appointment is: 1 week on 3/24/21.

## 2021-03-18 NOTE — NURSING NOTE
Due to patient being non-English speaking/uses sign language, an  was used for this visit. Only for face-to-face interpretation by an external agency, date and length of interpretation can be found on the scanned worksheet.     name: Jose Covington  Agency: [employer]  Language: Carole   Telephone number: [phone]  Type of interpretation: Telephone, spoken

## 2021-03-24 ENCOUNTER — OFFICE VISIT (OUTPATIENT)
Dept: FAMILY MEDICINE | Facility: CLINIC | Age: 21
End: 2021-03-24
Payer: COMMERCIAL

## 2021-03-24 DIAGNOSIS — O09.90 SUPERVISION OF HIGH RISK PREGNANCY, ANTEPARTUM: Primary | ICD-10-CM

## 2021-03-24 DIAGNOSIS — Z60.3 LANGUAGE BARRIER, CULTURAL DIFFERENCES: ICD-10-CM

## 2021-03-24 PROCEDURE — 99207 PR PRENATAL VISIT: CPT | Mod: GC | Performed by: STUDENT IN AN ORGANIZED HEALTH CARE EDUCATION/TRAINING PROGRAM

## 2021-03-24 SDOH — SOCIAL STABILITY - SOCIAL INSECURITY: ACCULTURATION DIFFICULTY: Z60.3

## 2021-03-24 NOTE — PROGRESS NOTES
Preceptor Attestation:    I discussed the patient with the resident and evaluated the patient in person. I have verified the content of the note, which accurately reflects my assessment of the patient and the plan of care.   Supervising Physician:  Miles Richey MD.

## 2021-03-24 NOTE — NURSING NOTE
Due to patient being non-English speaking/uses sign language, an  was used for this visit. Only for face-to-face interpretation by an external agency, date and length of interpretation can be found on the scanned worksheet.     name: Jose Covington  Agency: [employer]  Language: Carole   Telephone number: [phone]  Type of interpretation: Telephone, spoken

## 2021-03-24 NOTE — PATIENT INSTRUCTIONS
Delivery Plan      I NEED A Nigerien       Take me to: [facility]  Phone number: [phone]     My name is: Lucia Covington  : 2000     My Doctor is: Linda Tafoya MD   Attending Physician: [facility] Faculty  Prenatal care was at [facility] [phone].     20 year old                                                                                -para:   Estimated Date of Delivery: Mar 20, 2021  At 40w4d on 2021  Delivery type: Vaginal Delivery         Patient Active Problem List   Diagnosis     Supervision of high risk pregnancy, antepartum     Language barrier, cultural differences      Allergies:No Known Allergies     Lab Results:  Blood Type: O POS  GBS:negative Date completed: 21           Rubella IgG   Date/Time Value Ref Range Status   08/10/2020 10:00 AM Positive   Final            HIV Antigen/Antibody   Date/Time Value Ref Range Status   08/10/2020 10:00 AM Negative Negative Final      No results found for: N5UV        Hepatitis B Surface Antigen   Date/Time Value Ref Range Status   08/10/2020 10:00 AM Negative Negative Final            Hemoglobin   Date Value Ref Range Status   2020 11.9 11.7 - 15.7 g/dL Final   08/10/2020 13.0 11.7 - 15.7 g/dL Final         Last cervical check: 2021 Cervical Dilation: 2cm/20%/-3, soft, mid position.           Immunization History   Administered Date(s) Administered     HEPA 10/14/2015, 2016     HPV 2012, 2013, 2014     HPV Quadrivalent 2012, 2013     Hep B, Peds or Adolescent 10/01/2012, 2013     HepA-Adult 10/14/2015     HepA-ped 2 Dose 2016     HepB 2012, 10/01/2012, 2013     HepB, Unspecified 2012     Influenza (IIV3) PF 10/23/2012, 10/17/2013     Influenza Intranasal Vaccine 10/23/2012     Influenza Intranasal Vaccine 4 valent 10/17/2013     Influenza Vaccine IM > 6 months Valent IIV4 2020      Influenza Vaccine, 6+MO IM (QUADRIVALENT W/PRESERVATIVES) 11/26/2014, 10/14/2015, 11/04/2016, 11/08/2017, 10/19/2018, 10/18/2019     MMR 05/22/2012, 08/31/2012, 10/23/2012     MMR/V 10/23/2012     Meningococcal (Menactra ) 10/23/2012, 10/14/2015     Meningococcal (Menomune ) 10/23/2012     Poliovirus, inactivated (IPV) 10/01/2012, 11/23/2012, 06/12/2013     Td (Adult), Adsorbed 05/22/2012     Tdap (Adacel,Boostrix) 05/22/2012, 10/01/2012, 06/12/2013, 01/07/2021     Varicella 10/01/2012, 10/23/2012, 10/14/2015            Immunizations needed postpartum: None     Who will be present at the delivery?  Depends on days and time: Father of baby or sister. Maria Preston. Sister's name: Lukas Schulte Mo.      Do you have a ?No If no, would you like one? No      What are your plans for pain control?Natural     Who will cut the umbilical cord? Optional     Do you plan to breastfeed?Yes     Name of baby's clinic: [facility]     Would you like your baby to have the recommended vitamin K shot to prevent bleeding, Hepatitis B shot, and eye ointment to prevent eye infections?Yes     Next Appointment is: 1 week on 3/29/21.

## 2021-03-24 NOTE — PROGRESS NOTES
Assessment & Plan  20 year old  at 40w4d with SG Mar 20, 2021 based on LMP and first trimester US. She is starting to feel more contractions and pressure. GBS negative as of 21. Ready to have baby. Heart tones are reassuring and fundal height is around 40-41cm which is appropriate. Discussed labor preparation and to continue attempting some position changes to help move baby lower. She would like to not be induced at this time and see if she delivers by 42 weeks. Discussed to keep monitoring kick counts and to call Elle if having any signs of labor. Cervical check today with some change to about 2cm/20%/-3. Cervix has become a lot softer. Will follow up with me on Monday and at that time would start biweekly NST/BPP until patient is 42 weeks and discuss possible induction.     Lucia was seen today for prenatal care.    Diagnoses and all orders for this visit:    Supervision of high risk pregnancy, antepartum    Language barrier, cultural differences        Weight gain adequate: 14.2 kg (31 lb 6.4 oz) to date, out of recommended total of 25-35 lbs (pregravid BMI 18.5-24.9)    Patient Instructions     Delivery Plan      I NEED A [employer]       Take me to: [facility]  Phone number: [phone]     My name is: Lucia Covington  : 2000     My Doctor is: Linda Tafoya MD   Attending Physician: [facility]  Prenatal care was at [facility] [phone].     20 year old                                                                                -para:   Estimated Date of Delivery: Mar 20, 2021  At 40w4d on 2021  Delivery type: Vaginal Delivery         Patient Active Problem List   Diagnosis     Supervision of high risk pregnancy, antepartum     Language barrier, cultural differences      Allergies:No Known Allergies     Lab Results:  Blood Type: O POS  GBS:negative Date completed: 21           Rubella IgG    Date/Time Value Ref Range Status   08/10/2020 10:00 AM Positive   Final            HIV Antigen/Antibody   Date/Time Value Ref Range Status   08/10/2020 10:00 AM Negative Negative Final      No results found for: N5UV        Hepatitis B Surface Antigen   Date/Time Value Ref Range Status   08/10/2020 10:00 AM Negative Negative Final            Hemoglobin   Date Value Ref Range Status   12/07/2020 11.9 11.7 - 15.7 g/dL Final   08/10/2020 13.0 11.7 - 15.7 g/dL Final         Last cervical check: March 24, 2021 Cervical Dilation: 2cm/20%/-3, soft, mid position.           Immunization History   Administered Date(s) Administered     HEPA 10/14/2015, 11/04/2016     HPV 11/23/2012, 06/12/2013, 06/09/2014     HPV Quadrivalent 11/23/2012, 06/12/2013     Hep B, Peds or Adolescent 10/01/2012, 06/12/2013     HepA-Adult 10/14/2015     HepA-ped 2 Dose 11/04/2016     HepB 05/22/2012, 10/01/2012, 06/12/2013     HepB, Unspecified 05/22/2012     Influenza (IIV3) PF 10/23/2012, 10/17/2013     Influenza Intranasal Vaccine 10/23/2012     Influenza Intranasal Vaccine 4 valent 10/17/2013     Influenza Vaccine IM > 6 months Valent IIV4 09/23/2020     Influenza Vaccine, 6+MO IM (QUADRIVALENT W/PRESERVATIVES) 11/26/2014, 10/14/2015, 11/04/2016, 11/08/2017, 10/19/2018, 10/18/2019     MMR 05/22/2012, 08/31/2012, 10/23/2012     MMR/V 10/23/2012     Meningococcal (Menactra ) 10/23/2012, 10/14/2015     Meningococcal (Menomune ) 10/23/2012     Poliovirus, inactivated (IPV) 10/01/2012, 11/23/2012, 06/12/2013     Td (Adult), Adsorbed 05/22/2012     Tdap (Adacel,Boostrix) 05/22/2012, 10/01/2012, 06/12/2013, 01/07/2021     Varicella 10/01/2012, 10/23/2012, 10/14/2015            Immunizations needed postpartum: None     Who will be present at the delivery?  Depends on days and time: Father of baby or sister. Maria Preston. Sister's name: Lukas Angel.      Do you have a ?No If no, would you like one? No      What are your plans for pain  control?Natural     Who will cut the umbilical cord? Optional     Do you plan to breastfeed?Yes     Name of baby's clinic: [facility]     Would you like your baby to have the recommended vitamin K shot to prevent bleeding, Hepatitis B shot, and eye ointment to prevent eye infections?Yes     Next Appointment is: 1 week on 3/29/21.         Return to clinic on Monday.     Linda Tafoya MD PGY3  [facility]    I precepted today with Miles Richey MD.    Subjective  Concerns: Has started to have mucusy discharge, started one day after last appointment, on 3/19. Had bleeding after cervical check. Started having contractions last night, baby was moving. Having lower back pain as well that has become more frequent contractions. Tightness comes and goes, but, not very often.     ROS:  YES (had to get up early so is tired) - Headache  No - Changes in vision  No - Chest Pain  No - Shortness of Breath  No - Nausea   No - Vomiting  YES - Abdominal pain   YES - Contractions  No - Dysuria   YES - Vaginal Discharge    No - Vaginal bleeding   No - Loss of Fluid   No - Extremity swelling   Present - Fetal movement       Going to WIC? Yes    Patient Active Problem List   Diagnosis     Supervision of high risk pregnancy, antepartum     Language barrier, cultural differences       Lucia Covington speaks Senegalese so an  was used today.    Guidance:  post-partum care  signs of labor  pain control during labor    Do you need help getting a car seat? No  Do you need help getting a breast pump? No      Objective  /88 (BP Location: Left arm, Patient Position: Sitting, Cuff Size: Adult Regular)   Pulse 85   Temp 98.4  F (36.9  C) (Oral)   Resp 16   Wt 68 kg (150 lb)   LMP 05/29/2020 (Approximate)   SpO2 95%   BMI 28.58 kg/m    No distress.  Gravid abdomen.  .  Fundal height 40.5 cm.  trace edema.  Cervix: posterior and dilated to 2cm, 20% effaced and -3 station.     Results  Blood type: O  POS  No results found for any visits on 03/24/21.

## 2021-03-29 ENCOUNTER — COMMUNICATION - HEALTHEAST (OUTPATIENT)
Dept: SCHEDULING | Facility: CLINIC | Age: 21
End: 2021-03-29

## 2021-04-01 RX ORDER — IBUPROFEN 200 MG
200-400 TABLET ORAL EVERY 4 HOURS PRN
Qty: 100 TABLET | Refills: 0 | Status: SHIPPED | OUTPATIENT
Start: 2021-04-01 | End: 2024-06-06

## 2021-04-01 RX ORDER — DOCUSATE SODIUM 100 MG/1
100 CAPSULE, LIQUID FILLED ORAL 2 TIMES DAILY PRN
Qty: 30 CAPSULE | Refills: 0 | Status: SHIPPED | OUTPATIENT
Start: 2021-04-01 | End: 2021-11-18

## 2021-04-07 VITALS
DIASTOLIC BLOOD PRESSURE: 88 MMHG | SYSTOLIC BLOOD PRESSURE: 118 MMHG | BODY MASS INDEX: 28.58 KG/M2 | OXYGEN SATURATION: 95 % | RESPIRATION RATE: 16 BRPM | TEMPERATURE: 98.4 F | HEART RATE: 85 BPM | WEIGHT: 150 LBS

## 2021-04-07 PROBLEM — O09.90 SUPERVISION OF HIGH RISK PREGNANCY, ANTEPARTUM: Status: RESOLVED | Noted: 2020-08-10 | Resolved: 2021-04-07

## 2021-04-08 ENCOUNTER — TELEPHONE (OUTPATIENT)
Dept: FAMILY MEDICINE | Facility: CLINIC | Age: 21
End: 2021-04-08

## 2021-04-08 NOTE — TELEPHONE ENCOUNTER
Called pt with [facility] [phone] [employer] (I)  ABOUT BABY:  Jose Rafael Tomlinsone 3/29/21  1) How is feeding going? Breastfeeding on demand every 1 hour.    2) Do you have the things you need to take care of your baby? Yes    3) Any change in urination, stooling, or skin color? 4-5 wet diapers and 4 stools per day.  No concerns for skin color.    4) Any other concerns you have about your baby? No         ABOUT MOM:  Lucia Lazar Adria [birth year]  1) Any concerns about bleeding, stooling, urination, or abdominal pain? Vaginal bleeding decreased, voiding/stooling without difficulty, and no abd pain.     2) How is your mood and how are you coping? Mood is okay/normal    3) What is your plan for contraception? Nexplanon or Depo Provera. Recommended a telephone visit at 6 weeks to do postpartum visit and discuss contraception options with your physician.    Then we would be able to start, inject or place contraception at timing of 2month check for baby.  Remind mom that she MUST abstain from intercourse or use condoms until this visit so she would be eligible for contraception at time of 2 month visit for baby. Scheduled 6 wk postpartum appt: Monday, 5/3/21 at 9:20 AM with Dr Tafoya./SANTIAGO

## 2021-05-03 ENCOUNTER — OFFICE VISIT (OUTPATIENT)
Dept: FAMILY MEDICINE | Facility: CLINIC | Age: 21
End: 2021-05-03
Payer: COMMERCIAL

## 2021-05-03 VITALS
RESPIRATION RATE: 16 BRPM | SYSTOLIC BLOOD PRESSURE: 110 MMHG | HEART RATE: 82 BPM | WEIGHT: 122.8 LBS | TEMPERATURE: 98.2 F | OXYGEN SATURATION: 98 % | DIASTOLIC BLOOD PRESSURE: 75 MMHG | BODY MASS INDEX: 23.39 KG/M2

## 2021-05-03 DIAGNOSIS — Z30.017 ENCOUNTER FOR INITIAL PRESCRIPTION OF NEXPLANON: ICD-10-CM

## 2021-05-03 LAB — HCG UR QL: NEGATIVE

## 2021-05-03 PROCEDURE — 99207 PR PRENATAL VISIT: CPT | Mod: GC | Performed by: STUDENT IN AN ORGANIZED HEALTH CARE EDUCATION/TRAINING PROGRAM

## 2021-05-03 PROCEDURE — 81025 URINE PREGNANCY TEST: CPT | Performed by: STUDENT IN AN ORGANIZED HEALTH CARE EDUCATION/TRAINING PROGRAM

## 2021-05-03 NOTE — PROGRESS NOTES
HPI-Post Partum Visit -       Lucia Covington is a 20 year old  female  without a significant past medical history who presents for a postpartum visit. OBSTETRIC HISTORY:      A Nupur  was used for  this visit.   was used for  this visit.     Patient Active Problem List   Diagnosis     Language barrier, cultural differences       Current Outpatient Medications   Medication Sig Dispense Refill     docusate sodium (COLACE) 100 MG capsule Take 1 capsule (100 mg) by mouth 2 times daily as needed for constipation 30 capsule 0     ibuprofen (ADVIL/MOTRIN) 200 MG tablet Take 1-2 tablets (200-400 mg) by mouth every 4 hours as needed for mild pain 100 tablet 0     Prenatal Vit-Fe Fumarate-FA (PRENATAL MULTIVITAMIN W/IRON) 27-0.8 MG tablet Take 1 tablet by mouth daily 90 tablet 3                      Delivery date: 3/29/21      Patient had a  of viable girl, weight 8 lbs 4 ozs,           with no complications.          Accompanying Signs & Symptoms:                        Breast feeding: breastfeeding going well, every 1-3 hrs, 8-12 times/24 hours   Abdominal pain: no                       Bleeding since delivery: no        Contraception choice: Nexplanon        Patient has not had intercourse since delivery        Last PAP: Needs first pap  Pt screened for postpartum depression and complaints are: NEGATIVE Tired today.         PHQ9= 0        MARY= 0    Receiving dental care YES   Calcium intake is adequate     No Known Allergies         Review of Systems:   CONSTITUTIONAL: no fatigue, no unexpected change in weight  SKIN: no worrisome rashes or lesions  EYES: no acute vision problems or changes  ENT: no ear problems, no mouth problems, no throat problems  RESP: no significant cough, no shortness of breath  CV: no chest pain, no palpitations, no new or worsening peripheral edema  GI: no nausea, no vomiting, no constipation, no diarrhea  : no frequency, no dysuria, no  hematuria  NEURO: no weakness, no dizziness, no headaches  ENDOCRINE: no temperature intolerance, no skin/hair changes  PSYCHIATRIC: NEGATIVE for changes in mood or trouble with sleep            Physical Exam:     Vitals:    05/03/21 0923   BP: 110/75   BP Location: Left arm   Patient Position: Sitting   Cuff Size: Adult Regular   Pulse: 82   Resp: 16   Temp: 98.2  F (36.8  C)   TempSrc: Oral   SpO2: 98%   Weight: 55.7 kg (122 lb 12.8 oz)       GENERAL: healthy, alert, well nourished, well hydrated, no distress  HENT: ear canals- normal; TMs- normal; Nose- normal; Mouth- no ulcers, no lesions  NECK: no tenderness, no adenopathy, no asymmetry, no masses, no stiffness; thyroid- normal to palpation  RESP: lungs clear to auscultation - no rales, no rhonchi, no wheezes  CV: regular rates and rhythm, normal S1 S2, no S3 or S4 and no murmur, no click or rub -  ABDOMEN: soft, no tenderness, no  hepatosplenomegaly, no masses, normal bowel sounds      Office Visit on 02/22/2021   Component Date Value Ref Range Status     Group B Strep Antigen 02/22/2021 Negative  Negative Final     Allergic To Penicillin 02/22/2021 No   Final     Specific Gravity Urine 02/22/2021 1.020  1.005 - 1.030 Final     pH Urine 02/22/2021 7.0  4.5 - 8.0 Final     Leukocyte Esterase UR 02/22/2021 1+* NEGATIVE Final     Nitrite Urine 02/22/2021 Negative  NEGATIVE mg/dL Final     Protein UR 02/22/2021 Negative  NEGATIVE mg/dL Final     Glucose Urine 02/22/2021 Negative  NEGATIVE Final     Ketones Urine 02/22/2021 Negative  NEGATIVE mg/dL Final     Urobilinogen mg/dL 02/22/2021 0.2 E.U./dL  0.2 E.U./dL E.U./dL Final     Bilirubin UR 02/22/2021 Negative  NEGATIVE mg/dL Final     Blood UR 02/22/2021 Negative  NEGATIVE mg/dL Final     WBC Urine 02/22/2021 10-25  <5 /hpf Final     RBC Urine 02/22/2021 None  <5 /hpf Final     Epithelial Cells UR 02/22/2021 5-10  0 - 2 /lpf Final     Mucous Urine 02/22/2021 None  NONE lpf Final     Casts Urine 02/22/2021 None   NONE /lpf Final     Crystal Urine 02/22/2021 None  NONE /lpf Final     Bacteria Wet Prep 02/22/2021 Few  None Final    Comment: Result note:  WBC clumps           Assessment and Plan   Lucia was seen today for post partum exam and medication reconciliation.    Diagnoses and all orders for this visit:    Routine postpartum follow-up    Encounter for initial prescription of Nexplanon  -     HCG Qualitative Urine (UPT)  ([facility])        -Tdap for pertussis prophylaxis:  -Pap indicated: Will perform once feeling better   -Contraception: Nexplanon, plan for beta hcg now and then return on 5/21/21.     Options for treatment and follow-up care were reviewed with the patient and/or guardian. Lucia Covington and/or guardian engaged in the decision making process and verbalized understanding of the options discussed and agreed with the final plan.    Patient and plan discussed with Dr. Alex Snyder.    Linda Tafoya MD PGY3  [facility]

## 2021-05-03 NOTE — PROGRESS NOTES
Preceptor Attestation:    I discussed the patient with the resident and evaluated the patient in person. I have verified the content of the note, which accurately reflects my assessment of the patient and the plan of care.   Supervising Physician:  Alex Snyder MD.

## 2021-05-03 NOTE — NURSING NOTE
Due to patient being non-English speaking/uses sign language, an  was used for this visit. Only for face-to-face interpretation by an external agency, date and length of interpretation can be found on the scanned worksheet.     name: Jose Covington  Agency: [employer]  Language: Carole   Telephone number: [phone]  Type of interpretation: Telephone, spoken

## 2021-05-03 NOTE — PATIENT INSTRUCTIONS
You are doing great!    Please make an appointment if you would like to for 5/21/21 with me for your nexplanon placement    Let me know if you need anything!    Take care,    Dr. Tafoya

## 2021-05-21 ENCOUNTER — OFFICE VISIT (OUTPATIENT)
Dept: FAMILY MEDICINE | Facility: CLINIC | Age: 21
End: 2021-05-21
Payer: COMMERCIAL

## 2021-05-21 ENCOUNTER — IMMUNIZATION (OUTPATIENT)
Dept: FAMILY MEDICINE | Facility: CLINIC | Age: 21
End: 2021-05-21
Payer: COMMERCIAL

## 2021-05-21 VITALS
OXYGEN SATURATION: 97 % | RESPIRATION RATE: 16 BRPM | HEART RATE: 89 BPM | SYSTOLIC BLOOD PRESSURE: 111 MMHG | TEMPERATURE: 98.1 F | DIASTOLIC BLOOD PRESSURE: 77 MMHG

## 2021-05-21 DIAGNOSIS — Z97.5 NEXPLANON IN PLACE: ICD-10-CM

## 2021-05-21 DIAGNOSIS — Z30.017 INSERTION OF IMPLANTABLE SUBDERMAL CONTRACEPTIVE: ICD-10-CM

## 2021-05-21 DIAGNOSIS — Z30.017 ENCOUNTER FOR INITIAL PRESCRIPTION OF NEXPLANON: Primary | ICD-10-CM

## 2021-05-21 LAB — HCG UR QL: NEGATIVE

## 2021-05-21 PROCEDURE — 0011A PR COVID VAC MODERNA 100 MCG/0.5 ML IM: CPT

## 2021-05-21 PROCEDURE — 11981 INSERTION DRUG DLVR IMPLANT: CPT | Mod: GC | Performed by: STUDENT IN AN ORGANIZED HEALTH CARE EDUCATION/TRAINING PROGRAM

## 2021-05-21 PROCEDURE — 81025 URINE PREGNANCY TEST: CPT | Performed by: STUDENT IN AN ORGANIZED HEALTH CARE EDUCATION/TRAINING PROGRAM

## 2021-05-21 PROCEDURE — 91301 PR COVID VAC MODERNA 100 MCG/0.5 ML IM: CPT

## 2021-05-21 NOTE — PROGRESS NOTES
Preceptor Attestation:    I discussed the patient with the resident and evaluated the patient in person. I was present for and supervised the entire procedure. I have verified the content of the note, which accurately reflects my assessment of the patient and the plan of care.   Supervising Physician:  Morenita Cruz MD.

## 2021-05-21 NOTE — PROGRESS NOTES
Procedure Note-Nexplanon Insertion    Lucia Covington is a patient of Linda Romero here for placement of etonogestrel implant (Nexplanon)    Indication:Contraception    Consent: Affirmation of informed consent was signed and scanned into the medical record. Risks, benefits and alternatives were discussed. Patient's questions were elicited and answered. Confirmed patient has not had intercourse in last two weeks and two negative pregnancy tests.   Procedure safety checklist was completed:  Yes  Time Out (Pause for the Cause) completed: Yes    Labs: UPT:  Negative  LMP:   Patient's last menstrual period was 05/12/2021 (exact date).       Previous Contraception:  none  Counseling:  Pt. counseled on potential side effects of  Nexplanon, including unpredictable spotting/bleeding and plan for removal/replacement of Nexplanon in 3 years or less.    Preoperative Diagnosis: Desires effective contraception  Postoperative Diagnosis: etonogestrel implant in place  Skin prep Betadine  Anesthesia 1% lidocaine, with epi    Technique:   Patient was placed supine with left arm exposed.  Selma was made 8-10cm above medial epicondial and a guiding selma 4 cm above the first.  Arm was prepped with betadine.Insertion point was anesthetized with 1% lidocaine 2mL. After stretching the skin with thumb and index finger around the insertion site.  Skin punctured with the tip of the needle inserted at 30 degrees and then lowered to horizontal position. While lifting the skin with the tip of the needle, slided the needle to it's full length. Applicator was then stabilized and the purple slider was unlocked by pushing it slightly down. Slider moved back completely until it stopped. Applicator was then removed.  Correct placement of the implant was confirmed by palpation in the patient's arm and visualizing the purple top of the obturator.  Insertion site was dressed with an adhesive covered by a pressure dressing.      User card and patient  chart label filled out. User card  given to patient for record. Nexplanon added to medication list     Lot# [  ]    EBL: minimal  Complications:  No  Tolerance:  Pt tolerated procedure well and was in stable condition.         Follow up: Pt was instructed to call if bleeding, severe pain or foul smell.       Resident: Rakel Antunez MD  Faculty: Dr. Morenita Cruz present for and supervised this entire procedure.

## 2021-06-18 ENCOUNTER — IMMUNIZATION (OUTPATIENT)
Dept: FAMILY MEDICINE | Facility: CLINIC | Age: 21
End: 2021-06-18
Attending: FAMILY MEDICINE
Payer: COMMERCIAL

## 2021-06-18 PROCEDURE — 91301 PR COVID VAC MODERNA 100 MCG/0.5 ML IM: CPT

## 2021-06-18 PROCEDURE — 0012A PR COVID VAC MODERNA 100 MCG/0.5 ML IM: CPT

## 2021-11-18 ENCOUNTER — OFFICE VISIT (OUTPATIENT)
Dept: FAMILY MEDICINE | Facility: CLINIC | Age: 21
End: 2021-11-18
Payer: COMMERCIAL

## 2021-11-18 VITALS
OXYGEN SATURATION: 95 % | BODY MASS INDEX: 25.04 KG/M2 | DIASTOLIC BLOOD PRESSURE: 80 MMHG | SYSTOLIC BLOOD PRESSURE: 112 MMHG | RESPIRATION RATE: 16 BRPM | HEART RATE: 101 BPM | TEMPERATURE: 98.1 F | WEIGHT: 128.2 LBS

## 2021-11-18 DIAGNOSIS — Z00.00 ROUTINE GENERAL MEDICAL EXAMINATION AT A HEALTH CARE FACILITY: Primary | ICD-10-CM

## 2021-11-18 DIAGNOSIS — R05.9 COUGH: ICD-10-CM

## 2021-11-18 DIAGNOSIS — Z23 NEED FOR PROPHYLACTIC VACCINATION AND INOCULATION AGAINST INFLUENZA: ICD-10-CM

## 2021-11-18 PROCEDURE — 90471 IMMUNIZATION ADMIN: CPT | Performed by: STUDENT IN AN ORGANIZED HEALTH CARE EDUCATION/TRAINING PROGRAM

## 2021-11-18 PROCEDURE — G0123 SCREEN CERV/VAG THIN LAYER: HCPCS | Performed by: STUDENT IN AN ORGANIZED HEALTH CARE EDUCATION/TRAINING PROGRAM

## 2021-11-18 PROCEDURE — 99395 PREV VISIT EST AGE 18-39: CPT | Mod: 25 | Performed by: STUDENT IN AN ORGANIZED HEALTH CARE EDUCATION/TRAINING PROGRAM

## 2021-11-18 PROCEDURE — 90686 IIV4 VACC NO PRSV 0.5 ML IM: CPT | Performed by: STUDENT IN AN ORGANIZED HEALTH CARE EDUCATION/TRAINING PROGRAM

## 2021-11-18 RX ORDER — PRENATAL VIT/IRON FUM/FOLIC AC 27MG-0.8MG
1 TABLET ORAL DAILY
Qty: 90 TABLET | Refills: 3 | Status: SHIPPED | OUTPATIENT
Start: 2021-11-18 | End: 2024-06-06

## 2021-11-18 RX ORDER — FLUTICASONE PROPIONATE 50 MCG
1 SPRAY, SUSPENSION (ML) NASAL DAILY
Qty: 16 G | Refills: 1 | Status: SHIPPED | OUTPATIENT
Start: 2021-11-18 | End: 2024-06-06

## 2021-11-18 NOTE — LETTER
December 6, 2021      Lucia Lazar Adria  [address] [place] [address]        Dear ,    I am writing to inform you of your test results. Great news! Your pap smear is normal. We will repeat this again in 3 years. If you have any question, please give clinic a call at the number listed above. Thanks!    Resulted Orders   GYN Cytology   Result Value Ref Range    Interpretation        Negative for Intraepithelial Lesion or Malignancy (NILM)    Comment         Papanicolaou Test Limitations:  Cervical cytology is a screening test with limited sensitivity, and regular screening is critical for cancer prevention.  Pap tests are primarily effective for the diagnosis/prevention of squamous cell carcinoma, not adenocarcinoma or other cancers.        Specimen Adequacy       Satisfactory for evaluation, endocervical/transformation zone component present    Clinical Information       none[has nexplanon      HPV Reflex Yes if ASCUS     Previous Abnormal?       No      Performing Labs       The technical component of this testing was completed at [facility]       Sincerely,      Linda Murray MD

## 2021-11-18 NOTE — PATIENT INSTRUCTIONS
Use nasal spray twice daily. Come back in 2-4 weeks if no improvement.       Preventive Health Recommendations  Female Ages 21 to 25     Yearly exam:     See your health care provider every year in order to  o Review health changes.   o Discuss preventive care.    o Review your medicines if your doctor has prescribed any.      You should be tested each year for STDs (sexually transmitted diseases).       Talk to your provider about how often you should have cholesterol testing.      Get a Pap test every three years. If you have an abnormal result, your doctor may have you test more often.      If you are at risk for diabetes, you should have a diabetes test (fasting glucose).     Shots:     Get a flu shot each year.     Get a tetanus shot every 10 years.     Consider getting the shot (vaccine) that prevents cervical cancer (Gardasil).    Nutrition:     Eat at least 5 servings of fruits and vegetables each day.    Eat whole-grain bread, whole-wheat pasta and brown rice instead of white grains and rice.    Get adequate Calcium and Vitamin D.     Lifestyle    Exercise at least 150 minutes a week each week (30 minutes a day, 5 days a week). This will help you control your weight and prevent disease.    Limit alcohol to one drink per day.    No smoking.     Wear sunscreen to prevent skin cancer.    See your dentist every six months for an exam and cleaning.

## 2021-11-18 NOTE — PROGRESS NOTES
Female Physical Note    Concerns today:   1. Several years of coughing. Improved while she was pregnant. Doesn't keep her up at night. Itchy throat when she lays flat. Worse in the morning. No relation to food. No significant congestion. No known allergies. No history of TB.     A [employer]  was used for  this visit.     ROS:  CONSTITUTIONAL: no fatigue, no unexpected change in weight  SKIN: no worrisome rashes, no worrisome moles, no worrisome lesions  EYES: no acute vision problems or changes  ENT: no ear problems, no mouth problems, no throat problems  RESP: no significant cough, no shortness of breath  CV: no chest pain, no palpitations, no new or worsening peripheral edema  GI: no nausea, no vomiting, no constipation, no diarrhea    Sexually Active: No  Sexual concerns: No   Contraception: nexplanon   P: 1  Menarche:  No LMP recorded. Patient has had an implant.   STD History: Neg  Last Pap Smear Date: none  Abnormal Pap History: None    Patient Active Problem List   Diagnosis     Language barrier, cultural differences     Nexplanon in place       Current Outpatient Medications   Medication Sig Dispense Refill     ibuprofen (ADVIL/MOTRIN) 200 MG tablet Take 1-2 tablets (200-400 mg) by mouth every 4 hours as needed for mild pain 100 tablet 0     etonogestrel (NEXPLANON) 68 MG IMPL 1 each by Subdermal route once         Past Medical History:   Diagnosis Date     Known health problems: none         Family History     Problem (# of Occurrences) Relation (Name,Age of Onset)    Cancer (1) Mother: Esophageal,  in  at age 39    Diabetes (1) Father    Hyperlipidemia (1) Father    Hypertension (1) Father       Negative family history of: C.A.D., Cardiovascular, Coronary Artery Disease          Problem List Medication List and Allergy List were reviewed.    Patient is an established patient of this clinic..    Social History     Tobacco Use     Smoking status: Never Smoker     Smokeless tobacco:  Never Used     Tobacco comment: No Exposure    Substance Use Topics     Alcohol use: No     Single  Children ? yes    Has anyone hurt you physically, for example by pushing, hitting, slapping or kicking you or forcing you to have sex? Denies  Do you feel threatened or controlled by a partner, ex-partner or anyone in your life? Denies    RISK BEHAVIORS AND HEALTHY HABITS:  Tobacco Use/Smoking: None  Illicit Drug Use: None  Do you use alcohol? No  Diet (5-7 servings of fruits/veg daily): Yes   Exercise (30 min accumulated most days):Yes  Dental Care: Yes     Pap every 3 years for women 21-29. Recommended and patient accepted testing.    Immunization History   Administered Date(s) Administered     COVID-19,PF,Moderna 05/21/2021, 06/18/2021     HEPA 10/14/2015, 11/04/2016     HPV 11/23/2012, 06/12/2013, 06/09/2014     HPV Quadrivalent 11/23/2012, 06/12/2013     Hep B, Peds or Adolescent 10/01/2012, 06/12/2013     HepA-Adult 10/14/2015     HepA-ped 2 Dose 11/04/2016     HepB 05/22/2012, 10/01/2012, 06/12/2013     HepB, Unspecified 05/22/2012     Influenza (IIV3) PF 10/23/2012, 10/17/2013     Influenza Intranasal Vaccine 10/23/2012     Influenza Intranasal Vaccine 4 valent (FluMist) 10/17/2013     Influenza Vaccine IM > 6 months Valent IIV4 (Alfuria,Fluzone) 09/23/2020     Influenza Vaccine, 6+MO IM (QUADRIVALENT W/PRESERVATIVES) 11/26/2014, 10/14/2015, 11/04/2016, 11/08/2017, 10/19/2018, 10/18/2019     MMR 05/22/2012, 08/31/2012, 10/23/2012     MMR/V 10/23/2012     Meningococcal (Menactra ) 10/23/2012, 10/14/2015     Meningococcal (Menomune ) 10/23/2012     Poliovirus, inactivated (IPV) 10/01/2012, 11/23/2012, 06/12/2013     Td (Adult), Adsorbed 05/22/2012     Tdap (Adacel,Boostrix) 05/22/2012, 10/01/2012, 06/12/2013, 01/07/2021     Varicella 10/01/2012, 10/23/2012, 10/14/2015    Reviewed Immunization Record Today    EXAMINATION:   /80 (BP Location: Left arm, Patient Position: Sitting, Cuff Size: Adult Regular)    Pulse 101   Temp 98.1  F (36.7  C) (Oral)   Resp 16   Wt 58.2 kg (128 lb 3.2 oz)   SpO2 95%   Breastfeeding Yes   BMI 25.04 kg/m    GENERAL: healthy, alert and no distress  EYES: Eyes grossly normal to inspection, extraocular movements - intact, and PERRL  HENT: ear canals- normal; TMs- normal; Nose- normal; Mouth- no ulcers, no lesions  NECK: no tenderness, no adenopathy, no asymmetry, no masses, no stiffness; thyroid- normal to palpation  RESP: lungs clear to auscultation - no rales, no rhonchi, no wheezes  BREAST: no masses, no tenderness, no nipple discharge, no palpable axillary masses or adenopathy  CV: regular rates and rhythm, normal S1 S2, no S3 or S4 and no murmur, no click or rub -  ABDOMEN: soft, no tenderness, no  hepatosplenomegaly, no masses, normal bowel sounds  MS: extremities- no gross deformities noted, no edema  SKIN: no suspicious lesions, no rashes  NEURO: strength and tone- normal, sensory exam- grossly normal, mentation- intact, speech- normal, reflexes- symmetric  BACK: no CVA tenderness, no paralumbar tenderness  - female: cervix- normal, adnexae- normal; uterus- normal, no masses, no discharge  RECTAL- female: no masses, no hemorrhoids  PSYCH: Alert and oriented times 3; speech- coherent , normal rate and volume; able to articulate logical thoughts, able to abstract reason, no tangential thoughts, no hallucinations or delusions, affect- normal  LYMPHATICS: ant. cervical- normal, post. cervical- normal, axillary- normal, supraclavicular- normal, inguinal- normal    ASSESSMENT & PLAN:  1. Routine general medical examination at a health care facility  21-year-old female here for general physical exam.  Due for her first Pap smear today.  She is on the Nexplanon and not currently sexually active.  Overall doing well and has no concerns today except for cough as noted below.    - Prenatal Vit-Fe Fumarate-FA (PRENATAL MULTIVITAMIN W/IRON) 27-0.8 MG tablet; Take 1 tablet by mouth daily   Dispense: 90 tablet; Refill: 3  - GYN Cytology    2. Cough  Several years of chronic dry cough. She states that this started before the Covid pandemic.  She has not tried any medications.  Differential includes GERD, postnasal drip, reactive airway disease.  Since it got better during pregnancy, less likely to be GERD.  Will treat as postnasal drip and monitor for response.  - fluticasone (FLONASE) 50 MCG/ACT nasal spray; Spray 1 spray into both nostrils daily  Dispense: 16 g; Refill: 1    3. Need for prophylactic vaccination and inoculation against influenza  - INFLUENZA VACCINE IM > 6 MONTHS VALENT IIV4 (AFLURIA/FLUZONE)

## 2021-11-18 NOTE — PROGRESS NOTES
Preceptor Attestation:   Patient seen, evaluated and discussed with the resident. I have verified the content of the note, which accurately reflects my assessment of the patient and the plan of care.   Supervising Physician:  Charli Hou MD    [Negative] : Heme/Lymph

## 2021-11-18 NOTE — NURSING NOTE
Due to patient being non-English speaking/uses sign language, an  was used for this visit. Only for face-to-face interpretation by an external agency, date and length of interpretation can be found on the scanned worksheet.     name: Jose Covington  Agency: [employer]  Language: Carole   Telephone number: [phone]  Type of interpretation: Face-to-face, spoken

## 2021-11-19 LAB
BKR LAB AP GYN ADEQUACY: NORMAL
BKR LAB AP GYN INTERPRETATION: NORMAL
BKR LAB AP HPV REFLEX: NORMAL
BKR LAB AP PREVIOUS ABNORMAL: NORMAL
PATH REPORT.COMMENTS IMP SPEC: NORMAL
PATH REPORT.COMMENTS IMP SPEC: NORMAL
PATH REPORT.RELEVANT HX SPEC: NORMAL

## 2022-06-10 ENCOUNTER — OFFICE VISIT (OUTPATIENT)
Dept: FAMILY MEDICINE | Facility: CLINIC | Age: 22
End: 2022-06-10
Payer: COMMERCIAL

## 2022-06-10 VITALS
WEIGHT: 127 LBS | OXYGEN SATURATION: 98 % | BODY MASS INDEX: 23.98 KG/M2 | TEMPERATURE: 98.5 F | RESPIRATION RATE: 18 BRPM | HEART RATE: 80 BPM | DIASTOLIC BLOOD PRESSURE: 74 MMHG | SYSTOLIC BLOOD PRESSURE: 110 MMHG | HEIGHT: 61 IN

## 2022-06-10 DIAGNOSIS — R05.3 CHRONIC COUGH: Primary | ICD-10-CM

## 2022-06-10 PROCEDURE — 99214 OFFICE O/P EST MOD 30 MIN: CPT | Mod: GC | Performed by: STUDENT IN AN ORGANIZED HEALTH CARE EDUCATION/TRAINING PROGRAM

## 2022-06-10 RX ORDER — BUDESONIDE AND FORMOTEROL FUMARATE DIHYDRATE 80; 4.5 UG/1; UG/1
AEROSOL RESPIRATORY (INHALATION)
Qty: 6.9 G | Refills: 0 | Status: SHIPPED | OUTPATIENT
Start: 2022-06-10 | End: 2024-06-06

## 2022-06-10 NOTE — PROGRESS NOTES
"  Assessment & Plan     Chronic cough  Unclear cause of chronic cough. Have already tried treatment with Flonase for allergies/postnasal drip.  Differential includes GERD versus asthma.  Timing is more consistent with GERD however dry intermittent cough throughout the day more consistent with asthma.  No wheezing on exam.  Neither GERD nor asthma would typically improve with pregnancy.  We will start with Smart therapy and patient will add omeprazole in 2 to 3 weeks if no improvement.  Encouraged patient to follow up in clinic regardless of results of treatment.  Patient familiar with inhalers and knows how to use.  - budesonide-formoterol (SYMBICORT) 80-4.5 MCG/ACT Inhaler; Inhale 1-2 puffs as needed. May use up to 12 puffs per day.  - omeprazole (PRILOSEC) 20 MG DR capsule; Take 1 capsule (20 mg) by mouth daily       See Patient Instructions    No follow-ups on file.    Janice Peterson MD  [facility]    Temo Myers is a 21 year old who presents for the following health issues  accompanied by her daughter, sister and .    [facility]     Lucia is here to follow up on chronic cough. She has had this cough for years but it has gotten worse in the last month. She was last seen for this in November and it was thought to be postnasal drip as her cough improved while she was pregnant. She tried a whole bottle of Flonase and it didn't help.  No relation of pain to food.  No allergy symptoms, no sinus pressure.  No trouble breathing or chest pain.    Review of Systems   Constitutional, HEENT, cardiovascular, pulmonary, gi and gu systems are negative, except as otherwise noted.      Objective    /74 (BP Location: Left arm, Patient Position: Sitting, Cuff Size: Adult Regular)   Pulse 80   Temp 98.5  F (36.9  C) (Tympanic)   Resp 18   Ht 1.543 m (5' 0.75\")   Wt 57.6 kg (127 lb)   SpO2 98%   BMI 24.19 kg/m    Body mass index is 24.19 kg/m .  Physical Exam   GENERAL: healthy, alert " and no distress  NECK: no adenopathy, no asymmetry, masses, or scars and thyroid normal to palpation  RESP: lungs clear to auscultation - no rales, rhonchi or wheezes. Intermittent dry cough, normal work of breathing.  CV: regular rate and rhythm, normal S1 S2, no S3 or S4, no murmur, click or rub, no peripheral edema and peripheral pulses strong  ABDOMEN: soft, nontender, no hepatosplenomegaly, no masses and bowel sounds normal  MS: no gross musculoskeletal defects noted, no edema    No results found for any visits on 06/10/22.    ----- Service Performed and Documented by Resident or Fellow ------

## 2022-06-10 NOTE — NURSING NOTE
Due to patient being non-English speaking/uses sign language, an  was used for this visit. Only for face-to-face interpretation by an external agency, date and length of interpretation can be found on the scanned worksheet.     name: Jose Covington  Agency: [employer]  Language: Carole   Telephone number: [phone]  Type of interpretation: Face-to-face, spoken

## 2022-06-10 NOTE — PROGRESS NOTES
"Preceptor attestation:  Vital signs reviewed: /74 (BP Location: Left arm, Patient Position: Sitting, Cuff Size: Adult Regular)   Pulse 80   Temp 98.5  F (36.9  C) (Tympanic)   Resp 18   Ht 1.543 m (5' 0.75\")   Wt 57.6 kg (127 lb)   SpO2 98%   BMI 24.19 kg/m      Patient seen, evaluated, and discussed with the resident.  I have verified the content of the note, which accurately reflects my assessment of the patient and the plan of care.    Supervising physician: Kelly Finn MD  [facility]  "

## 2022-06-10 NOTE — PATIENT INSTRUCTIONS
Thank you for discussing your health with us today!    We discussed the following during your visit:    The good news is that you have no signs of infection. However we still do not know what is causing your cough.   The three most common causes of a cough are allergies, asthma, and acid reflux. The Flonase was to treat you for allergies.  Asthma: please start using an inhaler, 1-2 puffs every 4 hours when coughing, up to 12 puffs in a day  If the inhaler does not work after 2-3 weeks, please add the omeprazole, which controls acid reflux      There are a lot more tests and medications we can try. If these medications do not work after 4-6 weeks, please come back to clinic    As always, please call the clinic if you have any questions or concerns.

## 2022-06-13 ENCOUNTER — TELEPHONE (OUTPATIENT)
Dept: FAMILY MEDICINE | Facility: CLINIC | Age: 22
End: 2022-06-13

## 2022-06-14 NOTE — TELEPHONE ENCOUNTER
PRIOR AUTHORIZATION DENIED    Medication: budesonide-formoterol (SYMBICORT) 80-4.5 MCG/ACT Inhaler - epa denied    Denial Date: 6/11/2022    Denial Rational:       Appeal Information:

## 2023-06-27 ENCOUNTER — OFFICE VISIT (OUTPATIENT)
Dept: FAMILY MEDICINE | Facility: CLINIC | Age: 23
End: 2023-06-27
Payer: COMMERCIAL

## 2023-06-27 VITALS
HEIGHT: 61 IN | DIASTOLIC BLOOD PRESSURE: 74 MMHG | WEIGHT: 136.6 LBS | RESPIRATION RATE: 18 BRPM | TEMPERATURE: 98.3 F | BODY MASS INDEX: 25.79 KG/M2 | HEART RATE: 72 BPM | OXYGEN SATURATION: 98 % | SYSTOLIC BLOOD PRESSURE: 106 MMHG

## 2023-06-27 DIAGNOSIS — Z30.46 SURVEILLANCE OF PREVIOUSLY PRESCRIBED IMPLANTABLE SUBDERMAL CONTRACEPTIVE: Primary | ICD-10-CM

## 2023-06-27 PROCEDURE — 11982 REMOVE DRUG IMPLANT DEVICE: CPT | Performed by: FAMILY MEDICINE

## 2023-06-27 NOTE — PROGRESS NOTES
Procedure Note-Nexplanon Removal    Lucia Covington is a patient of Damien Santos here for removal of etonogestrel implant Nexplanon/Implanon    Indication: Patient comes in today to have Nexplanon removed.  She has had it for 2 years.  She is not wanting to use other forms of contraception at this time.  It bothers her that she does not have a monthly period.    Consent: Affirmation of informed consent was signed and scanned into the medical record. Risks, benefits and alternatives were discussed. Patient's questions were elicited and answered.   Procedure safety checklist was completed:  Yes  Time Out (Pause for the Cause) completed: Yes      Preoperative Diagnosis: etonogestrel implant  Postoperative Diagnosis: etonogestrel implant removed    Technique:   Skin prep Betadine  Anesthesia 1% lidocaine, with epi  Procedure: Small incision (<5mm) was made at distal end of palpable implant, curved hemostat was used to isolate the implant and bring it to the incision, the fibrous capsule containing the implant  was incised and the Implant was removed intact.  EBL: minimal  Complications:  No  Tolerance:  Pt tolerated procedure well and was in stable condition.     Contraception was discussed and patient chose the following method none      Follow up: Pt was instructed to call if bleeding, severe pain or foul smell.     Follow up in 1 year.      Damien Shea MD

## 2023-10-06 PROBLEM — Z97.5 NEXPLANON IN PLACE: Status: RESOLVED | Noted: 2021-05-21 | Resolved: 2023-10-06

## 2023-10-08 ENCOUNTER — HOSPITAL ENCOUNTER (EMERGENCY)
Facility: HOSPITAL | Age: 23
Discharge: HOME | End: 2023-10-08
Attending: EMERGENCY MEDICINE | Admitting: EMERGENCY MEDICINE
Payer: COMMERCIAL

## 2023-10-08 VITALS
WEIGHT: 140.7 LBS | OXYGEN SATURATION: 97 % | BODY MASS INDEX: 26.59 KG/M2 | DIASTOLIC BLOOD PRESSURE: 70 MMHG | SYSTOLIC BLOOD PRESSURE: 138 MMHG | HEART RATE: 72 BPM | RESPIRATION RATE: 18 BRPM | TEMPERATURE: 98.1 F

## 2023-10-08 DIAGNOSIS — N92.6 IRREGULAR MENSTRUAL BLEEDING: ICD-10-CM

## 2023-10-08 LAB
ABO/RH(D): NORMAL
ALBUMIN UR-MCNC: NEGATIVE MG/DL
ANTIBODY SCREEN: NEGATIVE
APPEARANCE UR: CLEAR
BACTERIA #/AREA URNS HPF: ABNORMAL /HPF
BASO+EOS+MONOS # BLD AUTO: NORMAL 10*3/UL
BASO+EOS+MONOS NFR BLD AUTO: NORMAL %
BASOPHILS # BLD AUTO: 0 10E3/UL (ref 0–0.2)
BASOPHILS NFR BLD AUTO: 0 %
BILIRUB UR QL STRIP: NEGATIVE
COLOR UR AUTO: COLORLESS
EOSINOPHIL # BLD AUTO: 0.1 10E3/UL (ref 0–0.7)
EOSINOPHIL NFR BLD AUTO: 1 %
ERYTHROCYTE [DISTWIDTH] IN BLOOD BY AUTOMATED COUNT: 11.8 % (ref 10–15)
GLUCOSE UR STRIP-MCNC: NEGATIVE MG/DL
HCG INTACT+B SERPL-ACNC: <1 MIU/ML
HCT VFR BLD AUTO: 41.7 % (ref 35–47)
HGB BLD-MCNC: 14.4 G/DL (ref 11.7–15.7)
HGB UR QL STRIP: ABNORMAL
HOLD SPECIMEN: NORMAL
HOLD SPECIMEN: NORMAL
IMM GRANULOCYTES # BLD: 0 10E3/UL
IMM GRANULOCYTES NFR BLD: 0 %
KETONES UR STRIP-MCNC: NEGATIVE MG/DL
LEUKOCYTE ESTERASE UR QL STRIP: NEGATIVE
LYMPHOCYTES # BLD AUTO: 2.6 10E3/UL (ref 0.8–5.3)
LYMPHOCYTES NFR BLD AUTO: 31 %
MCH RBC QN AUTO: 29.1 PG (ref 26.5–33)
MCHC RBC AUTO-ENTMCNC: 34.5 G/DL (ref 31.5–36.5)
MCV RBC AUTO: 84 FL (ref 78–100)
MONOCYTES # BLD AUTO: 0.6 10E3/UL (ref 0–1.3)
MONOCYTES NFR BLD AUTO: 7 %
MUCOUS THREADS #/AREA URNS LPF: PRESENT /LPF
NEUTROPHILS # BLD AUTO: 5.2 10E3/UL (ref 1.6–8.3)
NEUTROPHILS NFR BLD AUTO: 61 %
NITRATE UR QL: NEGATIVE
NRBC # BLD AUTO: 0 10E3/UL
NRBC BLD AUTO-RTO: 0 /100
PH UR STRIP: 5 [PH] (ref 5–7)
PLATELET # BLD AUTO: 256 10E3/UL (ref 150–450)
RBC # BLD AUTO: 4.95 10E6/UL (ref 3.8–5.2)
RBC URINE: >182 /HPF
SP GR UR STRIP: 1.01 (ref 1–1.03)
SPECIMEN EXPIRATION DATE: NORMAL
SQUAMOUS EPITHELIAL: 6 /HPF
UROBILINOGEN UR STRIP-MCNC: <2 MG/DL
WBC # BLD AUTO: 8.5 10E3/UL (ref 4–11)
WBC URINE: <1 /HPF

## 2023-10-08 PROCEDURE — 99283 EMERGENCY DEPT VISIT LOW MDM: CPT

## 2023-10-08 PROCEDURE — 85025 COMPLETE CBC W/AUTO DIFF WBC: CPT | Performed by: EMERGENCY MEDICINE

## 2023-10-08 PROCEDURE — 81003 URINALYSIS AUTO W/O SCOPE: CPT | Performed by: EMERGENCY MEDICINE

## 2023-10-08 PROCEDURE — 86901 BLOOD TYPING SEROLOGIC RH(D): CPT | Performed by: EMERGENCY MEDICINE

## 2023-10-08 PROCEDURE — 36415 COLL VENOUS BLD VENIPUNCTURE: CPT | Performed by: EMERGENCY MEDICINE

## 2023-10-08 PROCEDURE — 84702 CHORIONIC GONADOTROPIN TEST: CPT | Performed by: EMERGENCY MEDICINE

## 2023-10-08 ASSESSMENT — ACTIVITIES OF DAILY LIVING (ADL): ADLS_ACUITY_SCORE: 33

## 2023-10-09 NOTE — ED PROVIDER NOTES
EMERGENCY DEPARTMENT NOTE     Name: Lucia Covington    Age/Sex: 23 year old female   MRN: [id number]   Evaluation Date & Time:  10/8/2023  8:33 PM    PCP:    Damien Shea   ED Provider: Rusty Monge D.O.       CHIEF COMPLAINT    Vaginal Bleeding       DIAGNOSIS & DISPOSITION/MEDICAL DECISION MAKING   No diagnosis found.    Lucia Covington is a 23 year old female with equivocal home pregnancy testing who presents to the emergency department for evaluation of vaginal bleeding    Differential  diagnosis considered included but not limited to ectopic pregnancy, complete or incomplete miscarriage, menstrual bleeding    Medical Decision Making  Patient on exam and nontender abdomen.  Quantitative hCG was nondetectable.  Urinalysis showed hematuria probably secondary to menses without pyuria with few bacteria and does not have urinary symptoms.  Hemoglobin was 14, WBC 8.5.  Current findings were discussed with the patient.  With the 1 home positive pregnancy and subsequent pregnancy test negative may have represented a early pregnancy that failed.  Patient will be discharged to home.  She will follow-up with her primary care physician as needed.  If any progressive symptoms including develop abdominal pain or fever will return to the emergency department.    Interventions:None  Discharge Vital Signs:/70   Pulse 72   Temp 98.1  F (36.7  C) (Oral)   Resp 18   Wt 63.8 kg (140 lb 11.2 oz)   SpO2 97%   BMI 26.59 kg/m       DISPOSITION: Home    Diagnostic studies:  Imaging:  None   Lab:  Labs Ordered and Resulted from Time of ED Arrival to Time of ED Departure - No data to display            Triage note reviewed:Has had 1 positive pregnancy test and 1 negative test. Started having some vag bleeding today. Denies pain. States a little bit of blood. 2nd pregnancy     Triage Assessment       Row Name 10/08/23 2031       Triage Assessment (Adult)    Airway WDL WDL                        History:  Supplemental history  from: Family Member/Significant Other  External Record(s) reviewed: [facility] office visit June 27, 2023    Work Up:  Chart documentation includes differential considered and any EKGs or imaging independently interpreted by provider, where specified.  In additional to work up documented, I considered the following work up: Ultrasound but deferred secondary to negative quantitative hCG    External consultation:  Discussion of management with another provider: Documented in chart, if applicable    Complicating factors:  Care impacted by chronic illness: N/A  Care affected by social determinants of health: N/A    Disposition considerations: Discharge. No recommendations on prescription strength medication(s). N/A.    At the conclusion of the encounter I discussed the results of all of the tests and the disposition. The questions were answered. The patient or family acknowledged understanding and was agreeable with the care plan.    TOTAL CRITICAL CARE TIME (EXCLUDING PROCEDURES): Not applicable    PROCEDURES:   None    EMERGENCY DEPARTMENT COURSE   9:53 PM I met with the patient to gather history and to perform my initial exam.  We discussed treatment options and the plan for care while in the Emergency Department.  10:03 PM We discussed the plan for discharge and the patient is agreeable. Reviewed supportive cares, symptomatic treatment, outpatient follow up, and reasons to return to the Emergency Department. All questions and concerns were addressed. Patient to be discharged by ED RN.      ED INTERVENTIONS   Medications - No data to display    DISCHARGE MEDICATIONS        Review of your medicines        UNREVIEWED medicines. Ask your doctor about these medicines        Dose / Directions   budesonide-formoterol 80-4.5 MCG/ACT Inhaler  Commonly known as: SYMBICORT  Used for: Chronic cough      Inhale 1-2 puffs as needed. May use up to 12 puffs per day.  Quantity: 6.9 g  Refills: 0     etonogestrel 68 MG  Impl  Commonly known as: NEXPLANON      Dose: 1 each  1 each by Subdermal route once  Refills: 0     fluticasone 50 MCG/ACT nasal spray  Commonly known as: FLONASE  Used for: Cough      Dose: 1 spray  Spray 1 spray into both nostrils daily  Quantity: 16 g  Refills: 1     ibuprofen 200 MG tablet  Commonly known as: ADVIL/MOTRIN  Used for:  (normal spontaneous vaginal delivery)      Dose: 200-400 mg  Take 1-2 tablets (200-400 mg) by mouth every 4 hours as needed for mild pain  Quantity: 100 tablet  Refills: 0     omeprazole 20 MG DR capsule  Commonly known as: PriLOSEC  Used for: Chronic cough      Dose: 20 mg  Take 1 capsule (20 mg) by mouth daily  Quantity: 30 capsule  Refills: 0     prenatal multivitamin w/iron 27-0.8 MG tablet  Used for: Routine general medical examination at a health care facility      Dose: 1 tablet  Take 1 tablet by mouth daily  Quantity: 90 tablet  Refills: 3                INFORMATION SOURCE AND LIMITATIONS    History/Exam limitations: N/A   Patient information was obtained from: The patient   Use of : Yes (In person) - Language Carole    HISTORY OF PRESENT ILLNESS   Lucia Covington is a 23 year old year old female with no known relevant past medical history (reviewed), who presents to this ED from home for evaluation of vaginal bleeding.    The patient has not had her period for 2 months and today she took two pregnancy tests. One positive pregnancy test and one negative test. She then started having some vaginal bleeding today. She endorses some slight back pain. She denies cough, chest pain, diarrhea, nausea, and vomiting. No other complaints at this time.     REVIEW OF SYSTEMS:   All other systems reviewed and are negative except as noted above in HPI.    PATIENT HISTORY     Past Medical History:   Diagnosis Date    Known health problems: none     Nexplanon in place 2021     Patient Active Problem List   Diagnosis    Language barrier, cultural differences     No past  surgical history on file.    No Known Allergies    OUTPATIENT MEDICATIONS     New Prescriptions    No medications on file      Vitals:    10/08/23 2031   BP: (!) 148/78   Pulse: 79   Resp: 18   Temp: 98.1  F (36.7  C)   TempSrc: Oral   SpO2: 96%   Weight: 63.8 kg (140 lb 11.2 oz)       Physical Exam   Constitutional: Oriented to person, place, and time. Appears well-developed and well-nourished.   HEENT:    Head: Atraumatic.   Neck: Normal range of motion. Neck supple.   Cardiovascular: Normal rate, regular rhythm and normal heart sounds.    Pulmonary/Chest: Normal effort  and breath sounds normal.   Abdominal: Soft. Bowel sounds are normal.   Musculoskeletal: Normal range of motion.   Neurological: Alert and oriented to person, place, and time. Normal strength. No sensory deficit. No cranial nerve deficit.  Skin: Skin is warm and dry.   Psychiatric: Normal mood and affect. Behavior is normal. Thought content normal.     DIAGNOSTICS    LABORATORY FINDINGS (REVIEWED AND INTERPRETED):  Labs Ordered and Resulted from Time of ED Arrival to Time of ED Departure - No data to display      IMAGING (REVIEWED AND INTERPRETED):  OB  US 1st trimester w transvag    (Results Pending)         I, Laurie Archer, am serving as a scribe to document services personally performed by Rusty Monge D.O., based on my observation and the provider s statements to me.    I, Rusty Monge D.O., attest that Laurie Archer is acting in a scribe capacity, has observed my performance of the services and has documented them in accordance with my direction.    Rusty Monge D.O.  EMERGENCY MEDICINE   10/08/23  [facility] EMERGENCY DEPARTMENT  [address] [place] [id number]  [phone]  Dept: [phone]     Rusty Monge,   10/09/23 0511

## 2023-10-09 NOTE — DISCHARGE INSTRUCTIONS
If you have any progressive symptoms including development of any abdominal pain, fever with temperature greater than 101 or if heavy vaginal bleeding greater than 1 pad per hour for several hours return to the emergency department.

## 2023-10-09 NOTE — ED TRIAGE NOTES
Has had 1 positive pregnancy test and 1 negative test. Started having some vag bleeding today. Denies pain. States a little bit of blood. 2nd pregnancy     Triage Assessment       Row Name 10/08/23 2031       Triage Assessment (Adult)    Airway WDL WDL

## 2023-10-18 DIAGNOSIS — Z32.01 POSITIVE PREGNANCY TEST: Primary | ICD-10-CM

## 2024-05-21 ENCOUNTER — OFFICE VISIT (OUTPATIENT)
Dept: FAMILY MEDICINE | Facility: CLINIC | Age: 24
End: 2024-05-21
Payer: COMMERCIAL

## 2024-05-21 VITALS
BODY MASS INDEX: 21.47 KG/M2 | DIASTOLIC BLOOD PRESSURE: 73 MMHG | HEIGHT: 66 IN | OXYGEN SATURATION: 97 % | SYSTOLIC BLOOD PRESSURE: 108 MMHG | RESPIRATION RATE: 16 BRPM | WEIGHT: 133.6 LBS | TEMPERATURE: 99 F | HEART RATE: 86 BPM

## 2024-05-21 DIAGNOSIS — Z32.01 POSITIVE PREGNANCY TEST: Primary | ICD-10-CM

## 2024-05-21 LAB — HCG UR QL: POSITIVE

## 2024-05-21 PROCEDURE — 99213 OFFICE O/P EST LOW 20 MIN: CPT | Mod: GC

## 2024-05-21 PROCEDURE — 81025 URINE PREGNANCY TEST: CPT

## 2024-05-21 RX ORDER — PRENATAL VIT/IRON FUM/FOLIC AC 27MG-0.8MG
1 TABLET ORAL DAILY
Qty: 90 TABLET | Refills: 3 | Status: SHIPPED | OUTPATIENT
Start: 2024-05-21

## 2024-05-21 NOTE — PROGRESS NOTES
Preceptor Attestation:    I discussed the patient with the resident and evaluated the patient in person. I have verified the content of the note, which accurately reflects my assessment of the patient and the plan of care.   Supervising Physician:  Josh Gallagher MD.

## 2024-05-22 ENCOUNTER — CARE COORDINATION (OUTPATIENT)
Dept: FAMILY MEDICINE | Facility: CLINIC | Age: 24
End: 2024-05-22
Payer: COMMERCIAL

## 2024-05-22 NOTE — PROGRESS NOTES
Spoke with Jose ODEN) for pt and OB Intake appt scheduled for 6/6/24 at 8:00 AM.  Pt needs a ride arranged and told Jose Covington to let her know that ride will be set up with [employer].    Medical Transportation Coordination    Appt Date: 6/6/24               Arrival Time: 8:00 AM    Appt Location & Address:   [facility]    Total family members:   1    Tranportation Name & Phone Number:   [employer] [phone]     Estimated Pick-up Time:   7:00-7:30 AM    Roundtrip?  Yes  Patient Notified?  Yes- Jose Covington  will let pt know.    Haley Macias RN BSN

## 2024-05-26 NOTE — PROGRESS NOTES
Assessment & Plan     Positive pregnancy test  Here for pregnancy confirmation. LMP 3/20/2024, and so far denied N/V, chest pain, SOB, abdominal pain, bloody discharge, urinary symptoms, and swelling in extremities. Pt reported periodic headaches without vision changes. Prior pregnancy resulting in full-term  at 41 weeks; no complications throughout pregnancy and at delivery. Pregnancy unplanned, but pt enthusiastic and looking forward to adding to their family. Pt is unmarried to FOB; they live together with their 2-year-old daughter.    - HCG qualitative urine, positive  - Prenatal Vit-Fe Fumarate-FA (PRENATAL MULTIVITAMIN W/IRON) 27-0.8 MG tablet; Take 1 tablet by mouth daily  - Advised Haley Macias, ob nurse, about pt and need for prenatal care, assuming positive result  - Provided anticipatory guidance about medication, personal, and dietary safety.     Return to clinic on 2024, for OB intake with Dr. Delia Estrada MD.     This patient precepted with Dr. Josh Gallagher MD.    Crow Bourgeois MD JD, PGY-1  Resident Physician  [facility]     Subjective   Lucia is a 23 year old, presenting for the following health issues:  Pregnancy Test (Positive at home test a week ago. LMP: 2024)    HPI   Positive pregnancy test at home and here, for confirmation. Pregnancy unexpected but pt and her family excited and looking forward to adding to their family. Pt and FOB are a couple, living together and raising their 2-year-old daughter. No complications during last pregnancy and delivered via  at 41 weeks. Aside from intermittent headaches without vision changes, pt denied SOB, chest pain, abdominal pain, urinary symptoms, bloody or other abnormal discharge, and swelling in extremities.     Review of Systems  Constitutional, HEENT, cardiovascular, pulmonary, gi and gu systems are negative, except as otherwise noted.      Objective    /73   Pulse 86   Temp 99  F  "(37.2  C) (Tympanic)   Resp 16   Ht 1.676 m (5' 6\")   Wt 60.6 kg (133 lb 9.6 oz)   LMP 03/20/2024 (Exact Date)   SpO2 97%   BMI 21.56 kg/m    Body mass index is 21.56 kg/m .  Physical Exam   GENERAL: alert and no distress  NECK: no adenopathy, no asymmetry, masses, or scars  RESP: lungs clear to auscultation - no rales, rhonchi or wheezes  CV: regular rate and rhythm, normal S1 S2, no S3 or S4, no murmur, click or rub, no peripheral edema  ABDOMEN: soft, nontender, no hepatosplenomegaly, no masses and bowel sounds normal  MS: no gross musculoskeletal defects noted, no edema            Signed Electronically by: PANKAJ CRESPO MD  "

## 2024-06-06 ENCOUNTER — ANCILLARY PROCEDURE (OUTPATIENT)
Dept: ULTRASOUND IMAGING | Facility: HOSPITAL | Age: 24
End: 2024-06-06
Attending: STUDENT IN AN ORGANIZED HEALTH CARE EDUCATION/TRAINING PROGRAM
Payer: COMMERCIAL

## 2024-06-06 ENCOUNTER — OFFICE VISIT (OUTPATIENT)
Dept: FAMILY MEDICINE | Facility: CLINIC | Age: 24
End: 2024-06-06
Payer: COMMERCIAL

## 2024-06-06 DIAGNOSIS — J45.20 MILD INTERMITTENT ASTHMA WITHOUT COMPLICATION: Primary | ICD-10-CM

## 2024-06-06 DIAGNOSIS — Z34.90 INTRAUTERINE PREGNANCY: ICD-10-CM

## 2024-06-06 PROCEDURE — 99213 OFFICE O/P EST LOW 20 MIN: CPT | Mod: 25 | Performed by: STUDENT IN AN ORGANIZED HEALTH CARE EDUCATION/TRAINING PROGRAM

## 2024-06-06 PROCEDURE — 76815 OB US LIMITED FETUS(S): CPT | Performed by: STUDENT IN AN ORGANIZED HEALTH CARE EDUCATION/TRAINING PROGRAM

## 2024-06-06 NOTE — PROGRESS NOTES
Pregnancy Ultrasound for Dating and Initial Medical Assessment    23 year old,, at 11w1d by certain LMP.  Regular periods but every 2 months, not every 1 month.    Pregnancy was unplanned.  Pregnancy is welcomed.    No vaginal bleeding  No pain  Minimal nausea/vomiting  Good PO tolerance    OB History    Para Term  AB Living   2 1 1 0 0 1   SAB IAB Ectopic Multiple Live Births   0 0 0 0 1      # Outcome Date GA Lbr Franky/2nd Weight Sex Type Anes PTL Lv   2 Current            1 Term 20 41w2d  3.742 kg (8 lb 4 oz) F  Nitrous, IV N ADRIANA      Birth Comments: 2nd degree perineal lac repaired      Name: Jose Rafael Covington       Patient Active Problem List   Diagnosis    Language barrier, cultural differences    Mild intermittent asthma without complication       No past surgical history on file.    No Known Allergies    Current Outpatient Medications   Medication Sig Dispense Refill    Prenatal Vit-Fe Fumarate-FA (PRENATAL MULTIVITAMIN W/IRON) 27-0.8 MG tablet Take 1 tablet by mouth daily 90 tablet 3     No current facility-administered medications for this visit.       Social history:  Living situation: Lives with in laws, child and . Sister and brother in law, niece.  Feels safe.   Work: Stays at home.   Substances:  Tobacco, betel nut: None  Alcohol: None  Nonprescribed substances, including marijuana: None    LMP 2024 (Exact Date)         Ultrasound Findings: See Media section for images  Transabdominal images obtained    Cervix visually closed  + Fundal intrauterine gestational sac with decidual reaction  + Yolk sac   + Fetal pole  + Cardiac motion  + Gross movement    CRL =1.85 cm = 8w2d  CRL =1.73 cm = 8w2d  CRL =1.74 cm = 8w2d    Average CRL = 1.79cm = 8w2d      Impression: Single living IUP at 8w2d by today's early dating ultrasound.    C/W LMP? No  Redate? Yes  Today's assigned GA: 8w2d  Final SG: 2025    Genetic Screen Plans: Deferred discussion    Recommendations for  Prenatal Care:  Standard prenatal care    Aspirin prophylaxis starting at 12-16 wk: No  The patient does not have a history of:   Any ONE of the following high risk factors:  Pregestational DM1 or DM2  Chronic HTN  Autoimmune dz (Eg SLE, APLS)   H/o Preeclampsia in prior pregnancy  Multifetal gestation  6.   Renal Disease     TWO or more of the following moderate risk factors:  Nulliparity or > 10 years since last birth  Obesity (BMI > 30)  H/o preeclampsia in mother or sister  Age ? 35 years  Experienced anti-black racism or social/structural factors that could impact health  Personal history factors (prior SGA/low birthweight, prior fetal demise)  so WILL NOT start low dose aspirin (81mg) at 12-28 weeks (ideally 12-16 weeks) to prevent preeclampsia.        Diagnoses and all orders for this visit:    Mild intermittent asthma without complication: No longer requiring inhaler. Continue to monitor; currently an acceptable Hemabate candidate if needed.        Follow Up:  No future appointments.    Delia Estrada MD

## 2024-06-06 NOTE — NURSING NOTE
Pt is moving to [place] this weekend to be closer to family.  Gave her proof of pregnancy and her daughter's immunization records to take with her when she establishes care with a new provider.  She states that she is feeling well right now and has no questions or concerns.    Average Risk Category  No significant risk factors: Yes    At Risk Category (up to 3)  Teen pregnancy: No  Poor social situation: No  Domestic abuse: No  Financial difficulties: No  Smoker: No  H/O  deliver: No  H/O drug abuse: No  Non-English speaking: Yes  Advanced maternal age: No  GDM risks: No  Previous C/S: No  H/O PIH: No  H/O STIs: No  H/O mental health concerns: No  Onset care > 20 weeks: No      High Risk Category (4 or more At Risk or)  Diabetes/GDM: No  Multiple gestation: No  Chronic hypertension: No  Significant hx of asthma: No  Fetal demise > 20 weeks: No  Positive tox screen: No  Current mental health treatment: No      Risk: At Risk   Date determined: 24    Discussed and gave out the following handouts: 1st trimester fetal development, first trimester pregnancy symptoms, when to see medical advice (ie vomiting frequently, vaginal bleeding or cramping), nutrition during pregnancy, nonmedicinal prevention/treatment of nausea & vomiting, heartburn, and constipation. Gave proof of pregnancy and phone number for clinic and WIC./Haley Macias RN BSN

## 2024-11-07 ENCOUNTER — TELEPHONE (OUTPATIENT)
Dept: FAMILY MEDICINE | Facility: CLINIC | Age: 24
End: 2024-11-07
Payer: COMMERCIAL